# Patient Record
Sex: MALE | Race: ASIAN | NOT HISPANIC OR LATINO | URBAN - METROPOLITAN AREA
[De-identification: names, ages, dates, MRNs, and addresses within clinical notes are randomized per-mention and may not be internally consistent; named-entity substitution may affect disease eponyms.]

---

## 2021-09-03 ENCOUNTER — EMERGENCY (EMERGENCY)
Facility: HOSPITAL | Age: 51
LOS: 1 days | Discharge: HOME | End: 2021-09-03
Attending: EMERGENCY MEDICINE | Admitting: EMERGENCY MEDICINE
Payer: COMMERCIAL

## 2021-09-03 VITALS — SYSTOLIC BLOOD PRESSURE: 215 MMHG | DIASTOLIC BLOOD PRESSURE: 151 MMHG

## 2021-09-03 VITALS
SYSTOLIC BLOOD PRESSURE: 216 MMHG | RESPIRATION RATE: 20 BRPM | HEIGHT: 72 IN | DIASTOLIC BLOOD PRESSURE: 115 MMHG | TEMPERATURE: 99 F | HEART RATE: 88 BPM | WEIGHT: 214.95 LBS | OXYGEN SATURATION: 97 %

## 2021-09-03 PROCEDURE — 99284 EMERGENCY DEPT VISIT MOD MDM: CPT

## 2021-09-03 PROCEDURE — 99283 EMERGENCY DEPT VISIT LOW MDM: CPT

## 2021-09-03 RX ORDER — AMLODIPINE BESYLATE 2.5 MG/1
1 TABLET ORAL
Qty: 30 | Refills: 0
Start: 2021-09-03 | End: 2021-10-02

## 2021-09-03 RX ORDER — AMLODIPINE BESYLATE 2.5 MG/1
10 TABLET ORAL ONCE
Refills: 0 | Status: COMPLETED | OUTPATIENT
Start: 2021-09-03 | End: 2021-09-03

## 2021-09-03 RX ADMIN — Medication 0.1 MILLIGRAM(S): at 22:01

## 2021-09-03 RX ADMIN — AMLODIPINE BESYLATE 10 MILLIGRAM(S): 2.5 TABLET ORAL at 23:18

## 2021-09-03 NOTE — ED PROCEDURE NOTE - PROCEDURE ADDITIONAL DETAILS
a soup spoon was wedged between the upper teeth and unable to be removed by pt using direct pressure and traction the spoon was slowly removed on one side of the mouth allowing it to come free.  pt co soreness to jaw no other complications visible no visible dental trauma will refer to omfs

## 2021-09-03 NOTE — ED PROVIDER NOTE - NSFOLLOWUPINSTRUCTIONS_ED_ALL_ED_FT
NO CHEWING NO HARD FOODS NO SPOONS OR FORKS IN MOUTH  FOLLOW UP WITH PRIMARY CARE   FOLLOW UP WITH DENTAL OR ORAL SURGERY DR TREVIÑO [phone]  SOFT FOODS ONLY   TYLENOL FOR PAIN

## 2021-09-03 NOTE — ED PROVIDER NOTE - ENMT, MLM
Airway patent, pt has a large soup spoon wedged bewteen his upper teeth upsdie down.  no bleeeding no stridor no airway compromise the spoon is handle out easy to grasp

## 2021-09-03 NOTE — ED PROVIDER NOTE - CONSTITUTIONAL, MLM
normal... Well appearing, awake, alert, oriented to person, place, time/situation and upset aobut a soup spoon stuck in mouth no respiratory distress cooperative with care and evaluaiton able to sit

## 2021-09-03 NOTE — ED PROVIDER NOTE - PROGRESS NOTE DETAILS
spoon removed pt re-evaluated and the mouth has no abrasion contusions no visible dental trauma noted  there is a k-9 on left upper  that is sp surgery remotely and rotting but this is not acute. pt's bp remains elevated he is refusing further evaluation will offer catapres 0.1 mg and emphasize follow up with md bp not budging, will give norvasc and pt wants to leave has no sx  accepts additional meds will send rx of norvasc to pharmacy

## 2021-09-03 NOTE — ED PROVIDER NOTE - OBJECTIVE STATEMENT
pt tried to scratch his mouth with a spoon. the spoon got stuck in his mouth between the upper k9's.  after trying butter oil and other therapies including gargling with ice after 30 min he came to er for assistance  he denies airway involvement no bleeding no other complaints other than jaw pain from keeping his mouth open pt tried to taste something he was cooking and put a spoon upside down in his mouth. the spoon got stuck in his mouth between the upper k9's.  after trying butter oil and other therapies including gargling with ice after 30 min he came to er for assistance  he denies airway involvement no bleeding no other complaints other than jaw pain from keeping his mouth open  he denies any pmhx psxh has no pmd

## 2021-09-03 NOTE — ED PROVIDER NOTE - NSFOLLOWUPCLINICS_GEN_ALL_ED_FT
[facility] - ENT  Otolaryngology (ENT)  [address] [place] [address]  Phone: [phone]  Fax:     [facility] - Primary Care  Primary Care  [address] [place] [address]  Phone: [phone]  Fax:

## 2021-09-03 NOTE — ED PROVIDER NOTE - CHIEF COMPLAINT
----- Message from Bret Lambert MD sent at 3/6/2020  9:21 AM EST -----  Same 2 w  ----- Message -----  From: Tiff Caal  Sent: 3/6/2020   8:15 AM EST  To: Bret Lambert MD    INR 2.10 The patient is a 51y Male complaining of spoon stuck in mouth

## 2021-09-03 NOTE — ED PROVIDER NOTE - PATIENT PORTAL LINK FT
You can access the FollowMyHealth Patient Portal offered by [facility] by registering at the following website: [email or web address]. By joining [employer]’s FollowMyHealth portal, you will also be able to view your health information using other applications (apps) compatible with our system.

## 2021-09-03 NOTE — ED PROVIDER NOTE - CARE PLAN
1 Principal Discharge DX:	Foreign body in mouth, initial encounter   Principal Discharge DX:	Foreign body in mouth, initial encounter  Secondary Diagnosis:	Hypertension

## 2021-09-03 NOTE — ED PROVIDER NOTE - CARE PROVIDER_API CALL
Jeffrey Lizarraga (DDS)  OralMaxillofacial Surgery  [address] [facility], [address] [place] [address]  Phone: [phone]  Fax: [phone]  Follow Up Time:

## 2021-09-04 NOTE — ED ADULT NURSE NOTE - OBJECTIVE STATEMENT
Pt presents to the ED with a metal spoon stuck between his upper teeth. Pt states he was tasting something and placed the spoon upside down in his mouth and heard a "click". The spoon became lodged between his teeth. Pt attempted to remove the spoon at home but was unsuccessful, reports pain in his mouth.

## 2021-09-29 ENCOUNTER — TRANSCRIPTION ENCOUNTER (OUTPATIENT)
Age: 51
End: 2021-09-29

## 2021-11-04 ENCOUNTER — NON-APPOINTMENT (OUTPATIENT)
Age: 51
End: 2021-11-04

## 2021-11-04 ENCOUNTER — APPOINTMENT (OUTPATIENT)
Dept: INTERNAL MEDICINE | Facility: CLINIC | Age: 51
End: 2021-11-04
Payer: COMMERCIAL

## 2021-11-04 VITALS
TEMPERATURE: 97.9 F | BODY MASS INDEX: 29.12 KG/M2 | SYSTOLIC BLOOD PRESSURE: 174 MMHG | OXYGEN SATURATION: 97 % | DIASTOLIC BLOOD PRESSURE: 98 MMHG | HEIGHT: 72 IN | HEART RATE: 88 BPM | WEIGHT: 215 LBS

## 2021-11-04 VITALS — SYSTOLIC BLOOD PRESSURE: 154 MMHG | DIASTOLIC BLOOD PRESSURE: 88 MMHG

## 2021-11-04 DIAGNOSIS — Z86.19 PERSONAL HISTORY OF OTHER INFECTIOUS AND PARASITIC DISEASES: ICD-10-CM

## 2021-11-04 DIAGNOSIS — Z87.891 PERSONAL HISTORY OF NICOTINE DEPENDENCE: ICD-10-CM

## 2021-11-04 DIAGNOSIS — J30.9 ALLERGIC RHINITIS, UNSPECIFIED: ICD-10-CM

## 2021-11-04 DIAGNOSIS — Z23 ENCOUNTER FOR IMMUNIZATION: ICD-10-CM

## 2021-11-04 DIAGNOSIS — Z82.49 FAMILY HISTORY OF ISCHEMIC HEART DISEASE AND OTHER DISEASES OF THE CIRCULATORY SYSTEM: ICD-10-CM

## 2021-11-04 PROCEDURE — 99072 ADDL SUPL MATRL&STAF TM PHE: CPT

## 2021-11-04 PROCEDURE — G0008: CPT

## 2021-11-04 PROCEDURE — 99204 OFFICE O/P NEW MOD 45 MIN: CPT | Mod: 25

## 2021-11-04 PROCEDURE — 90686 IIV4 VACC NO PRSV 0.5 ML IM: CPT

## 2021-11-04 RX ORDER — B-COMPLEX WITH VITAMIN C
TABLET ORAL DAILY
Qty: 90 | Refills: 3 | Status: ACTIVE | COMMUNITY
Start: 2021-11-04

## 2021-11-04 RX ORDER — FLUTICASONE PROPIONATE 50 UG/1
50 SPRAY, METERED NASAL
Qty: 1 | Refills: 0 | Status: ACTIVE | COMMUNITY
Start: 2021-11-04

## 2021-11-04 NOTE — PHYSICAL EXAM

## 2021-11-04 NOTE — HISTORY OF PRESENT ILLNESS
[FreeTextEntry8] : Patient presented for the first time for transition of care, he's looking for a new PCP.\par \par Pt went to ED for a spoon that got stuck in his mouth in 9/2021, ED physician was able to pull it out in ED.   However, his BP was very high, and he was started on Amlodipine.  He noted that his HA actually resolved after he went on BP meds.  He had ran out of meds, and he actually had MD from his work place refill meds.  He ran out of meds 2 days ago and need refills.  His BP at home has been running 150-160/90s.\par \par Pt was well and did not get sick throughout the COVID pandemic. He had COVID vaccine and tolerated it well. \par \par He would like to have flu vaccine today.

## 2021-11-04 NOTE — ASSESSMENT
[FreeTextEntry1] : Patient has not had a physical exam for many years.  I advised that he schedule an appointment for PE, and I gave him prescription for routine labs, to be done prior to office visit.

## 2021-11-13 ENCOUNTER — LABORATORY RESULT (OUTPATIENT)
Age: 51
End: 2021-11-13

## 2021-11-15 LAB
25(OH)D3 SERPL-MCNC: 28.6 NG/ML
ALBUMIN SERPL ELPH-MCNC: 4.7 G/DL
ALP BLD-CCNC: 114 U/L
ALT SERPL-CCNC: 33 U/L
ANION GAP SERPL CALC-SCNC: 18 MMOL/L
APPEARANCE: CLEAR
AST SERPL-CCNC: 20 U/L
BASOPHILS # BLD AUTO: 0.04 K/UL
BASOPHILS NFR BLD AUTO: 0.5 %
BILIRUB SERPL-MCNC: 1 MG/DL
BILIRUBIN URINE: NEGATIVE
BLOOD URINE: NEGATIVE
BUN SERPL-MCNC: 14 MG/DL
CALCIUM SERPL-MCNC: 9.2 MG/DL
CHLORIDE SERPL-SCNC: 97 MMOL/L
CHOLEST SERPL-MCNC: 251 MG/DL
CO2 SERPL-SCNC: 21 MMOL/L
COLOR: YELLOW
CREAT SERPL-MCNC: 1.21 MG/DL
EOSINOPHIL # BLD AUTO: 0.14 K/UL
EOSINOPHIL NFR BLD AUTO: 1.6 %
ESTIMATED AVERAGE GLUCOSE: 266 MG/DL
GLUCOSE QUALITATIVE U: ABNORMAL
GLUCOSE SERPL-MCNC: 252 MG/DL
HBA1C MFR BLD HPLC: 10.9 %
HCT VFR BLD CALC: 46.2 %
HDLC SERPL-MCNC: 39 MG/DL
HGB BLD-MCNC: 16.7 G/DL
IMM GRANULOCYTES NFR BLD AUTO: 0.5 %
KETONES URINE: NORMAL
LDLC SERPL CALC-MCNC: 166 MG/DL
LEUKOCYTE ESTERASE URINE: NEGATIVE
LYMPHOCYTES # BLD AUTO: 2.45 K/UL
LYMPHOCYTES NFR BLD AUTO: 27.8 %
MAN DIFF?: NORMAL
MCHC RBC-ENTMCNC: 32.2 PG
MCHC RBC-ENTMCNC: 36.1 GM/DL
MCV RBC AUTO: 89 FL
MONOCYTES # BLD AUTO: 0.68 K/UL
MONOCYTES NFR BLD AUTO: 7.7 %
NEUTROPHILS # BLD AUTO: 5.47 K/UL
NEUTROPHILS NFR BLD AUTO: 61.9 %
NITRITE URINE: NEGATIVE
NONHDLC SERPL-MCNC: 212 MG/DL
PH URINE: 5.5
PLATELET # BLD AUTO: 228 K/UL
POTASSIUM SERPL-SCNC: 4.3 MMOL/L
PROT SERPL-MCNC: 7.5 G/DL
PROTEIN URINE: ABNORMAL
PSA SERPL-MCNC: 0.41 NG/ML
RBC # BLD: 5.19 M/UL
RBC # FLD: 11.9 %
SODIUM SERPL-SCNC: 136 MMOL/L
SPECIFIC GRAVITY URINE: 1.03
T4 FREE SERPL-MCNC: 1.3 NG/DL
TRIGL SERPL-MCNC: 227 MG/DL
TSH SERPL-ACNC: 3.44 UIU/ML
UROBILINOGEN URINE: NORMAL
WBC # FLD AUTO: 8.82 K/UL

## 2021-12-01 ENCOUNTER — APPOINTMENT (OUTPATIENT)
Dept: INTERNAL MEDICINE | Facility: CLINIC | Age: 51
End: 2021-12-01
Payer: COMMERCIAL

## 2021-12-01 ENCOUNTER — NON-APPOINTMENT (OUTPATIENT)
Age: 51
End: 2021-12-01

## 2021-12-01 VITALS
DIASTOLIC BLOOD PRESSURE: 96 MMHG | SYSTOLIC BLOOD PRESSURE: 149 MMHG | HEART RATE: 81 BPM | WEIGHT: 217 LBS | BODY MASS INDEX: 29.39 KG/M2 | TEMPERATURE: 98.4 F | OXYGEN SATURATION: 97 % | HEIGHT: 72 IN

## 2021-12-01 DIAGNOSIS — Z83.438 FAMILY HISTORY OF OTHER DISORDER OF LIPOPROTEIN METABOLISM AND OTHER LIPIDEMIA: ICD-10-CM

## 2021-12-01 PROCEDURE — 82270 OCCULT BLOOD FECES: CPT

## 2021-12-01 PROCEDURE — 99072 ADDL SUPL MATRL&STAF TM PHE: CPT

## 2021-12-01 PROCEDURE — 99396 PREV VISIT EST AGE 40-64: CPT | Mod: 25

## 2021-12-01 PROCEDURE — 93000 ELECTROCARDIOGRAM COMPLETE: CPT

## 2021-12-02 NOTE — ASSESSMENT
[FreeTextEntry1] : Patient never had a screening colonoscopy.  He was advised to consult with GI but will wait 3 months for DM control to improve.  Patient was also reminded to have routine eye exam and dental care.

## 2021-12-02 NOTE — DATA REVIEWED
[FreeTextEntry1] : EKG - NSR, R - 79, axis - (-)15, Incomplete RBBB.  Prior EKG not available for comparison.

## 2021-12-02 NOTE — PHYSICAL EXAM
[No Acute Distress] : no acute distress [Well Nourished] : well nourished [Well Developed] : well developed [Normal Sclera/Conjunctiva] : normal sclera/conjunctiva [PERRL] : pupils equal round and reactive to light [EOMI] : extraocular movements intact [Normal Outer Ear/Nose] : the outer ears and nose were normal in appearance [Normal Oropharynx] : the oropharynx was normal [Normal TMs] : both tympanic membranes were normal [Normal Nasal Mucosa] : the nasal mucosa was normal [No JVD] : no jugular venous distention [No Lymphadenopathy] : no lymphadenopathy [Supple] : supple [No Respiratory Distress] : no respiratory distress  [Clear to Auscultation] : lungs were clear to auscultation bilaterally [Normal Rate] : normal rate  [Regular Rhythm] : with a regular rhythm [Normal S1, S2] : normal S1 and S2 [No Carotid Bruits] : no carotid bruits [Pedal Pulses Present] : the pedal pulses are present [No Edema] : there was no peripheral edema [No Extremity Clubbing/Cyanosis] : no extremity clubbing/cyanosis [Normal Appearance] : normal in appearance [No Nipple Discharge] : no nipple discharge [No Axillary Lymphadenopathy] : no axillary lymphadenopathy [Soft] : abdomen soft [Non Tender] : non-tender [Non-distended] : non-distended [No Masses] : no abdominal mass palpated [No HSM] : no HSM [Normal Bowel Sounds] : normal bowel sounds [Normal Sphincter Tone] : normal sphincter tone [No Mass] : no mass [Prostate Enlargement] : the prostate was not enlarged [Prostate Tenderness] : the prostate was not tender [No Prostate Nodules] : no prostate nodules [Normal Supraclavicular Nodes] : no supraclavicular lymphadenopathy [Normal Axillary Nodes] : no axillary lymphadenopathy [Normal Posterior Cervical Nodes] : no posterior cervical lymphadenopathy [Normal Anterior Cervical Nodes] : no anterior cervical lymphadenopathy [No CVA Tenderness] : no CVA  tenderness [No Spinal Tenderness] : no spinal tenderness [No Joint Swelling] : no joint swelling [Grossly Normal Strength/Tone] : grossly normal strength/tone [No Rash] : no rash [Coordination Grossly Intact] : coordination grossly intact [No Focal Deficits] : no focal deficits [Normal Gait] : normal gait [Speech Grossly Normal] : speech grossly normal [Normal Affect] : the affect was normal [Alert and Oriented x3] : oriented to person, place, and time [Normal Mood] : the mood was normal [Stool Occult Blood] : stool negative for occult blood [de-identified] : Overweight male in stated age,

## 2021-12-02 NOTE — HEALTH RISK ASSESSMENT
[Yes] : Yes [Monthly or less (1 pt)] : Monthly or less (1 point) [1 or 2 (0 pts)] : 1 or 2 (0 points) [Never (0 pts)] : Never (0 points) [No] : In the past 12 months have you used drugs other than those required for medical reasons? No [Fair] : ~his/her~ current health as fair  [Good] : ~his/her~  mood as  good [No falls in past year] : Patient reported no falls in the past year [0] : 2) Feeling down, depressed, or hopeless: Not at all (0) [PHQ-2 Negative - No further assessment needed] : PHQ-2 Negative - No further assessment needed [None] : None [With Family] : lives with family [# of Members in Household ___] :  household currently consist of [unfilled] member(s) [Employed] : employed [Graduate School] : graduate school [] :  [# Of Children ___] : has [unfilled] children [Feels Safe at Home] : Feels safe at home [Fully functional (bathing, dressing, toileting, transferring, walking, feeding)] : Fully functional (bathing, dressing, toileting, transferring, walking, feeding) [Fully functional (using the telephone, shopping, preparing meals, housekeeping, doing laundry, using] : Fully functional and needs no help or supervision to perform IADLs (using the telephone, shopping, preparing meals, housekeeping, doing laundry, using transportation, managing medications and managing finances) [Smoke Detector] : smoke detector [Seat Belt] :  uses seat belt [] : No [Audit-CScore] : 1 [de-identified] : 30 minutes 1-2 x per week, [[id number]] : 0 [EyeExamDate] : 08/21, vision exam only [Change in mental status noted] : No change in mental status noted [Reports changes in hearing] : Reports no changes in hearing [Reports changes in vision] : Reports no changes in vision [Reports changes in dental health] : Reports no changes in dental health [ColonoscopyDate] : Never [de-identified] : dentist - 2019

## 2021-12-02 NOTE — HISTORY OF PRESENT ILLNESS
[FreeTextEntry1] : Pt presented for PE.  Last PE was many years ago. [de-identified] : Pt saw me for the first time last month after a visit to ED because of spoon being stuck in his mouth.  He was found to have elevated BP.  He saw  me for treatment of HTN.  He was started on Amlodipine 10 mg daily.  He tolerated meds well, and BP has improved, but is still not  optimal.  BP mostly 140-160/90s.  Pt is trying to make some lifestyle changes, but not doing much exercise yet.\par \par Pt c/o shoulder pain when he does push up.

## 2021-12-02 NOTE — REVIEW OF SYSTEMS
[Negative] : Heme/Lymph [Joint Pain] : joint pain [Chest Pain] : no chest pain [Palpitations] : no palpitations [Claudication] : no  leg claudication [Lower Ext Edema] : no lower extremity edema [Shortness Of Breath] : no shortness of breath [Wheezing] : no wheezing [Cough] : no cough [Dyspnea on Exertion] : not dyspnea on exertion [Abdominal Pain] : no abdominal pain [Nausea] : no nausea [Constipation] : no constipation [Diarrhea] : no diarrhea [Vomiting] : no vomiting [Heartburn] : no heartburn [Melena] : no melena [Dysuria] : no dysuria [Incontinence] : no incontinence [Nocturia] : no nocturia [Joint Stiffness] : no joint stiffness [Muscle Pain] : no muscle pain [Back Pain] : no back pain [Joint Swelling] : no joint swelling [Itching] : no itching [Mole Changes] : no mole changes [Skin Rash] : no skin rash [Headache] : no headache [Dizziness] : no dizziness [Fainting] : no fainting [Unsteady Walk] : no ataxia [Insomnia] : no insomnia [Anxiety] : no anxiety [Depression] : no depression [Easy Bleeding] : no easy bleeding [Easy Bruising] : no easy bruising [Swollen Glands] : no swollen glands [FreeTextEntry9] : B/L shoulder pain as in HPI,  [de-identified] : HA resolved on BP meds,

## 2022-02-08 ENCOUNTER — TRANSCRIPTION ENCOUNTER (OUTPATIENT)
Age: 52
End: 2022-02-08

## 2022-02-16 ENCOUNTER — TRANSCRIPTION ENCOUNTER (OUTPATIENT)
Age: 52
End: 2022-02-16

## 2022-02-28 ENCOUNTER — TRANSCRIPTION ENCOUNTER (OUTPATIENT)
Age: 52
End: 2022-02-28

## 2022-03-01 ENCOUNTER — APPOINTMENT (OUTPATIENT)
Dept: INTERNAL MEDICINE | Facility: CLINIC | Age: 52
End: 2022-03-01
Payer: COMMERCIAL

## 2022-03-01 VITALS
DIASTOLIC BLOOD PRESSURE: 110 MMHG | SYSTOLIC BLOOD PRESSURE: 175 MMHG | TEMPERATURE: 97.9 F | WEIGHT: 217 LBS | HEART RATE: 84 BPM | HEIGHT: 72 IN | BODY MASS INDEX: 29.39 KG/M2 | OXYGEN SATURATION: 96 %

## 2022-03-01 VITALS — DIASTOLIC BLOOD PRESSURE: 104 MMHG | SYSTOLIC BLOOD PRESSURE: 162 MMHG

## 2022-03-01 PROCEDURE — 99214 OFFICE O/P EST MOD 30 MIN: CPT

## 2022-03-01 PROCEDURE — 99072 ADDL SUPL MATRL&STAF TM PHE: CPT

## 2022-03-01 RX ORDER — ADHESIVE TAPE 3"X 2.3 YD
50 MCG TAPE, NON-MEDICATED TOPICAL
Qty: 90 | Refills: 3 | Status: ACTIVE | COMMUNITY
Start: 2021-11-04

## 2022-03-01 NOTE — HISTORY OF PRESENT ILLNESS
[FreeTextEntry1] : Pt presented for 3 month f/u of HTN and DM. [de-identified] : Pt saw eye doctor and noted no diabetic retinopathy.  Vision actually improved with diabetic meds and better glycemic control.\par \par He is on Losartan 100 mg daily, and BP at home is 160-170/100s.\par \par He feels well otherwise, he has some joint pain that was brief.\par \par Pt was well and did not get sick throughout the COVID pandemic.   He got his 3rd dose in 12/2021.\par \par He is eating healthier, but not much exercise, so no weight loss yet.

## 2022-03-01 NOTE — PHYSICAL EXAM
[No Acute Distress] : no acute distress [Well Nourished] : well nourished [Well Developed] : well developed [Supple] : supple [No Respiratory Distress] : no respiratory distress  [Clear to Auscultation] : lungs were clear to auscultation bilaterally [Normal Rate] : normal rate  [Regular Rhythm] : with a regular rhythm [Normal S1, S2] : normal S1 and S2 [No Edema] : there was no peripheral edema [Soft] : abdomen soft [Non Tender] : non-tender [Normal Bowel Sounds] : normal bowel sounds [No CVA Tenderness] : no CVA  tenderness [No Spinal Tenderness] : no spinal tenderness [No Joint Swelling] : no joint swelling [Grossly Normal Strength/Tone] : grossly normal strength/tone [Normal Gait] : normal gait [Speech Grossly Normal] : speech grossly normal [Normal Affect] : the affect was normal [Alert and Oriented x3] : oriented to person, place, and time [Normal Mood] : the mood was normal [de-identified] : Overweight male in stated age,

## 2022-03-07 LAB
ALBUMIN SERPL ELPH-MCNC: 4.8 G/DL
ALP BLD-CCNC: 92 U/L
ALT SERPL-CCNC: 36 U/L
ANION GAP SERPL CALC-SCNC: 17 MMOL/L
AST SERPL-CCNC: 25 U/L
BILIRUB SERPL-MCNC: 0.9 MG/DL
BUN SERPL-MCNC: 18 MG/DL
CALCIUM SERPL-MCNC: 10.1 MG/DL
CHLORIDE SERPL-SCNC: 103 MMOL/L
CHOLEST SERPL-MCNC: 208 MG/DL
CO2 SERPL-SCNC: 20 MMOL/L
CREAT SERPL-MCNC: 1.13 MG/DL
EGFR: 78 ML/MIN/1.73M2
ESTIMATED AVERAGE GLUCOSE: 174 MG/DL
GLUCOSE SERPL-MCNC: 140 MG/DL
HBA1C MFR BLD HPLC: 7.7 %
HDLC SERPL-MCNC: 42 MG/DL
LDLC SERPL CALC-MCNC: 116 MG/DL
NONHDLC SERPL-MCNC: 166 MG/DL
POTASSIUM SERPL-SCNC: 4.5 MMOL/L
PROT SERPL-MCNC: 7.6 G/DL
SODIUM SERPL-SCNC: 139 MMOL/L
TRIGL SERPL-MCNC: 253 MG/DL

## 2022-04-15 ENCOUNTER — TRANSCRIPTION ENCOUNTER (OUTPATIENT)
Age: 52
End: 2022-04-15

## 2022-04-18 ENCOUNTER — TRANSCRIPTION ENCOUNTER (OUTPATIENT)
Age: 52
End: 2022-04-18

## 2022-06-01 ENCOUNTER — APPOINTMENT (OUTPATIENT)
Dept: INTERNAL MEDICINE | Facility: CLINIC | Age: 52
End: 2022-06-01

## 2022-06-06 ENCOUNTER — TRANSCRIPTION ENCOUNTER (OUTPATIENT)
Age: 52
End: 2022-06-06

## 2022-06-07 ENCOUNTER — TRANSCRIPTION ENCOUNTER (OUTPATIENT)
Age: 52
End: 2022-06-07

## 2022-06-09 ENCOUNTER — TRANSCRIPTION ENCOUNTER (OUTPATIENT)
Age: 52
End: 2022-06-09

## 2022-06-10 ENCOUNTER — TRANSCRIPTION ENCOUNTER (OUTPATIENT)
Age: 52
End: 2022-06-10

## 2022-07-05 ENCOUNTER — RX CHANGE (OUTPATIENT)
Age: 52
End: 2022-07-05

## 2022-07-05 RX ORDER — METFORMIN HYDROCHLORIDE 500 MG/1
500 TABLET, COATED ORAL DAILY
Qty: 90 | Refills: 0 | Status: DISCONTINUED | COMMUNITY
Start: 2021-12-01 | End: 2022-07-05

## 2022-07-15 ENCOUNTER — TRANSCRIPTION ENCOUNTER (OUTPATIENT)
Age: 52
End: 2022-07-15

## 2022-07-18 ENCOUNTER — TRANSCRIPTION ENCOUNTER (OUTPATIENT)
Age: 52
End: 2022-07-18

## 2022-07-19 ENCOUNTER — TRANSCRIPTION ENCOUNTER (OUTPATIENT)
Age: 52
End: 2022-07-19

## 2022-08-01 LAB
ALBUMIN SERPL ELPH-MCNC: 4.6 G/DL
ALP BLD-CCNC: 93 U/L
ALT SERPL-CCNC: 22 U/L
ANION GAP SERPL CALC-SCNC: 18 MMOL/L
AST SERPL-CCNC: 22 U/L
BILIRUB SERPL-MCNC: 0.7 MG/DL
BUN SERPL-MCNC: 15 MG/DL
CALCIUM SERPL-MCNC: 9.9 MG/DL
CHLORIDE SERPL-SCNC: 102 MMOL/L
CHOLEST SERPL-MCNC: 202 MG/DL
CO2 SERPL-SCNC: 21 MMOL/L
CREAT SERPL-MCNC: 1.52 MG/DL
EGFR: 54 ML/MIN/1.73M2
ESTIMATED AVERAGE GLUCOSE: 148 MG/DL
GLUCOSE SERPL-MCNC: 136 MG/DL
HBA1C MFR BLD HPLC: 6.8 %
HDLC SERPL-MCNC: 41 MG/DL
LDLC SERPL CALC-MCNC: 116 MG/DL
NONHDLC SERPL-MCNC: 162 MG/DL
POTASSIUM SERPL-SCNC: 4.8 MMOL/L
PROT SERPL-MCNC: 7.4 G/DL
SODIUM SERPL-SCNC: 141 MMOL/L
TRIGL SERPL-MCNC: 230 MG/DL

## 2022-08-05 ENCOUNTER — APPOINTMENT (OUTPATIENT)
Dept: INTERNAL MEDICINE | Facility: CLINIC | Age: 52
End: 2022-08-05

## 2022-08-05 VITALS
WEIGHT: 213 LBS | SYSTOLIC BLOOD PRESSURE: 114 MMHG | OXYGEN SATURATION: 98 % | TEMPERATURE: 98.2 F | BODY MASS INDEX: 28.85 KG/M2 | HEART RATE: 78 BPM | DIASTOLIC BLOOD PRESSURE: 74 MMHG | HEIGHT: 72 IN | RESPIRATION RATE: 15 BRPM

## 2022-08-05 VITALS — SYSTOLIC BLOOD PRESSURE: 116 MMHG | DIASTOLIC BLOOD PRESSURE: 74 MMHG

## 2022-08-05 PROCEDURE — 99214 OFFICE O/P EST MOD 30 MIN: CPT

## 2022-08-05 RX ORDER — COVID-19 ANTIGEN TEST
KIT MISCELLANEOUS
Qty: 8 | Refills: 0 | Status: ACTIVE | COMMUNITY
Start: 2022-06-27

## 2022-08-05 RX ORDER — ZOSTER VACCINE RECOMBINANT, ADJUVANTED 50 MCG/0.5
50 KIT INTRAMUSCULAR
Qty: 1 | Refills: 1 | Status: COMPLETED | COMMUNITY
Start: 2022-02-15 | End: 2022-08-05

## 2022-08-05 RX ORDER — ZOSTER VACCINE RECOMBINANT, ADJUVANTED 50 MCG/0.5
50 KIT INTRAMUSCULAR
Qty: 1 | Refills: 1 | Status: COMPLETED | COMMUNITY
Start: 2022-02-28 | End: 2022-08-05

## 2022-08-06 NOTE — PHYSICAL EXAM
[No Acute Distress] : no acute distress [Well Nourished] : well nourished [Well Developed] : well developed [Supple] : supple [No Respiratory Distress] : no respiratory distress  [Clear to Auscultation] : lungs were clear to auscultation bilaterally [Normal Rate] : normal rate  [Regular Rhythm] : with a regular rhythm [Normal S1, S2] : normal S1 and S2 [No Edema] : there was no peripheral edema [Soft] : abdomen soft [Non Tender] : non-tender [Normal Bowel Sounds] : normal bowel sounds [No CVA Tenderness] : no CVA  tenderness [No Spinal Tenderness] : no spinal tenderness [No Joint Swelling] : no joint swelling [Grossly Normal Strength/Tone] : grossly normal strength/tone [Normal Gait] : normal gait [Speech Grossly Normal] : speech grossly normal [Normal Affect] : the affect was normal [Alert and Oriented x3] : oriented to person, place, and time [Normal Mood] : the mood was normal [de-identified] : Overweight male in stated age,

## 2022-08-06 NOTE — HISTORY OF PRESENT ILLNESS
[FreeTextEntry1] : Pt presented for f/u on HTN, HLD and glucose intolerance.  He had labs done and would like to review results. [de-identified] : Pt had a new job and is going through training.\par \par His daughter got COVID infection from school, and the whole family got it too.  Pt got the infection in early 7/2022.  He's had 3 doses of COVID vaccine.\par \par Patient feels well and has no other complaints.

## 2022-08-08 ENCOUNTER — TRANSCRIPTION ENCOUNTER (OUTPATIENT)
Age: 52
End: 2022-08-08

## 2022-08-26 ENCOUNTER — TRANSCRIPTION ENCOUNTER (OUTPATIENT)
Age: 52
End: 2022-08-26

## 2022-11-11 ENCOUNTER — RX RENEWAL (OUTPATIENT)
Age: 52
End: 2022-11-11

## 2022-11-23 ENCOUNTER — RX RENEWAL (OUTPATIENT)
Age: 52
End: 2022-11-23

## 2022-12-07 ENCOUNTER — TRANSCRIPTION ENCOUNTER (OUTPATIENT)
Age: 52
End: 2022-12-07

## 2022-12-08 ENCOUNTER — TRANSCRIPTION ENCOUNTER (OUTPATIENT)
Age: 52
End: 2022-12-08

## 2022-12-12 LAB
25(OH)D3 SERPL-MCNC: 42.3 NG/ML
ALBUMIN SERPL ELPH-MCNC: 5.1 G/DL
ALP BLD-CCNC: 97 U/L
ALT SERPL-CCNC: 38 U/L
ANION GAP SERPL CALC-SCNC: 15 MMOL/L
APPEARANCE: CLEAR
AST SERPL-CCNC: 39 U/L
BASOPHILS # BLD AUTO: 0.05 K/UL
BASOPHILS NFR BLD AUTO: 0.6 %
BILIRUB SERPL-MCNC: 0.6 MG/DL
BILIRUBIN URINE: NEGATIVE
BLOOD URINE: NEGATIVE
BUN SERPL-MCNC: 22 MG/DL
CALCIUM SERPL-MCNC: 10.4 MG/DL
CHLORIDE SERPL-SCNC: 99 MMOL/L
CHOLEST SERPL-MCNC: 202 MG/DL
CK SERPL-CCNC: 864 U/L
CO2 SERPL-SCNC: 25 MMOL/L
COLOR: YELLOW
CREAT SERPL-MCNC: 1.53 MG/DL
EGFR: 54 ML/MIN/1.73M2
EOSINOPHIL # BLD AUTO: 0.2 K/UL
EOSINOPHIL NFR BLD AUTO: 2.4 %
ESTIMATED AVERAGE GLUCOSE: 137 MG/DL
GLUCOSE QUALITATIVE U: NEGATIVE
GLUCOSE SERPL-MCNC: 130 MG/DL
HBA1C MFR BLD HPLC: 6.4 %
HCT VFR BLD CALC: 46.6 %
HDLC SERPL-MCNC: 46 MG/DL
HGB BLD-MCNC: 16.5 G/DL
IMM GRANULOCYTES NFR BLD AUTO: 0.4 %
KETONES URINE: NEGATIVE
LDLC SERPL CALC-MCNC: 109 MG/DL
LEUKOCYTE ESTERASE URINE: NEGATIVE
LYMPHOCYTES # BLD AUTO: 2.05 K/UL
LYMPHOCYTES NFR BLD AUTO: 25 %
MAN DIFF?: NORMAL
MCHC RBC-ENTMCNC: 32.2 PG
MCHC RBC-ENTMCNC: 35.4 GM/DL
MCV RBC AUTO: 91 FL
MONOCYTES # BLD AUTO: 0.63 K/UL
MONOCYTES NFR BLD AUTO: 7.7 %
NEUTROPHILS # BLD AUTO: 5.25 K/UL
NEUTROPHILS NFR BLD AUTO: 63.9 %
NITRITE URINE: NEGATIVE
NONHDLC SERPL-MCNC: 156 MG/DL
PH URINE: 5.5
PLATELET # BLD AUTO: 221 K/UL
POTASSIUM SERPL-SCNC: 5.6 MMOL/L
PROT SERPL-MCNC: 7.8 G/DL
PROTEIN URINE: NORMAL
PSA SERPL-MCNC: 0.36 NG/ML
RBC # BLD: 5.12 M/UL
RBC # FLD: 11.9 %
SODIUM SERPL-SCNC: 139 MMOL/L
SPECIFIC GRAVITY URINE: 1.02
TRIGL SERPL-MCNC: 235 MG/DL
UROBILINOGEN URINE: NORMAL
WBC # FLD AUTO: 8.21 K/UL

## 2022-12-16 ENCOUNTER — NON-APPOINTMENT (OUTPATIENT)
Age: 52
End: 2022-12-16

## 2022-12-16 ENCOUNTER — APPOINTMENT (OUTPATIENT)
Dept: INTERNAL MEDICINE | Facility: CLINIC | Age: 52
End: 2022-12-16

## 2022-12-16 VITALS
WEIGHT: 219 LBS | SYSTOLIC BLOOD PRESSURE: 122 MMHG | OXYGEN SATURATION: 98 % | HEIGHT: 72 IN | DIASTOLIC BLOOD PRESSURE: 77 MMHG | HEART RATE: 83 BPM | TEMPERATURE: 97.8 F | BODY MASS INDEX: 29.66 KG/M2

## 2022-12-16 VITALS — DIASTOLIC BLOOD PRESSURE: 78 MMHG | SYSTOLIC BLOOD PRESSURE: 126 MMHG

## 2022-12-16 DIAGNOSIS — E55.9 VITAMIN D DEFICIENCY, UNSPECIFIED: ICD-10-CM

## 2022-12-16 DIAGNOSIS — U07.1 COVID-19: ICD-10-CM

## 2022-12-16 DIAGNOSIS — L98.9 DISORDER OF THE SKIN AND SUBCUTANEOUS TISSUE, UNSPECIFIED: ICD-10-CM

## 2022-12-16 PROCEDURE — 99396 PREV VISIT EST AGE 40-64: CPT | Mod: 25

## 2022-12-16 PROCEDURE — 82270 OCCULT BLOOD FECES: CPT

## 2022-12-16 PROCEDURE — 93000 ELECTROCARDIOGRAM COMPLETE: CPT

## 2022-12-16 NOTE — PHYSICAL EXAM
[No Acute Distress] : no acute distress [Well Nourished] : well nourished [Well Developed] : well developed [Normal Sclera/Conjunctiva] : normal sclera/conjunctiva [PERRL] : pupils equal round and reactive to light [EOMI] : extraocular movements intact [Normal Outer Ear/Nose] : the outer ears and nose were normal in appearance [Normal Oropharynx] : the oropharynx was normal [Normal TMs] : both tympanic membranes were normal [Normal Nasal Mucosa] : the nasal mucosa was normal [No JVD] : no jugular venous distention [No Lymphadenopathy] : no lymphadenopathy [Supple] : supple [No Respiratory Distress] : no respiratory distress  [Clear to Auscultation] : lungs were clear to auscultation bilaterally [Normal Rate] : normal rate  [Regular Rhythm] : with a regular rhythm [Normal S1, S2] : normal S1 and S2 [No Carotid Bruits] : no carotid bruits [Pedal Pulses Present] : the pedal pulses are present [No Edema] : there was no peripheral edema [No Extremity Clubbing/Cyanosis] : no extremity clubbing/cyanosis [Normal Appearance] : normal in appearance [No Nipple Discharge] : no nipple discharge [No Axillary Lymphadenopathy] : no axillary lymphadenopathy [Soft] : abdomen soft [Non Tender] : non-tender [Non-distended] : non-distended [No Masses] : no abdominal mass palpated [No HSM] : no HSM [Normal Bowel Sounds] : normal bowel sounds [Normal Sphincter Tone] : normal sphincter tone [No Mass] : no mass [Prostate Enlargement] : the prostate was not enlarged [Prostate Tenderness] : the prostate was not tender [No Prostate Nodules] : no prostate nodules [Normal Supraclavicular Nodes] : no supraclavicular lymphadenopathy [Normal Axillary Nodes] : no axillary lymphadenopathy [Normal Posterior Cervical Nodes] : no posterior cervical lymphadenopathy [Normal Anterior Cervical Nodes] : no anterior cervical lymphadenopathy [No CVA Tenderness] : no CVA  tenderness [No Spinal Tenderness] : no spinal tenderness [No Joint Swelling] : no joint swelling [Grossly Normal Strength/Tone] : grossly normal strength/tone [No Rash] : no rash [Coordination Grossly Intact] : coordination grossly intact [No Focal Deficits] : no focal deficits [Normal Gait] : normal gait [Speech Grossly Normal] : speech grossly normal [Normal Affect] : the affect was normal [Alert and Oriented x3] : oriented to person, place, and time [Normal Mood] : the mood was normal [Stool Occult Blood] : stool negative for occult blood [de-identified] : Overweight male in stated age,  [de-identified] : Pt has 2 brown spot, slightly raised lesions on L axillae, the borderline were well defined, and the surface was relatively smooth,

## 2022-12-16 NOTE — ASSESSMENT
[FreeTextEntry1] : Patient never had a screening colonoscopy, and advised to consult with GI for the test.  Patient was also reminded to have routine eye exam and dental care.

## 2022-12-16 NOTE — HEALTH RISK ASSESSMENT
[Yes] : Yes [Monthly or less (1 pt)] : Monthly or less (1 point) [1 or 2 (0 pts)] : 1 or 2 (0 points) [Never (0 pts)] : Never (0 points) [No] : In the past 12 months have you used drugs other than those required for medical reasons? No [No falls in past year] : Patient reported no falls in the past year [0] : 2) Feeling down, depressed, or hopeless: Not at all (0) [PHQ-2 Negative - No further assessment needed] : PHQ-2 Negative - No further assessment needed [None] : None [With Family] : lives with family [# of Members in Household ___] :  household currently consist of [unfilled] member(s) [Employed] : employed [Graduate School] : graduate school [] :  [# Of Children ___] : has [unfilled] children [Feels Safe at Home] : Feels safe at home [Fully functional (bathing, dressing, toileting, transferring, walking, feeding)] : Fully functional (bathing, dressing, toileting, transferring, walking, feeding) [Fully functional (using the telephone, shopping, preparing meals, housekeeping, doing laundry, using] : Fully functional and needs no help or supervision to perform IADLs (using the telephone, shopping, preparing meals, housekeeping, doing laundry, using transportation, managing medications and managing finances) [Smoke Detector] : smoke detector [Seat Belt] :  uses seat belt [Good] : ~his/her~ current health as good [Former] : Former [0-4] : 0-4 [YearQuit] : 1990 [Audit-CScore] : 1 [de-identified] : 90 minutes 4 x per week, [[id number]] : 0 [EyeExamDate] : 03/22 [Change in mental status noted] : No change in mental status noted [Reports changes in hearing] : Reports no changes in hearing [Reports changes in dental health] : Reports no changes in dental health [Reports changes in vision] : Reports no changes in vision [ColonoscopyDate] : Never [de-identified] : dentist - 2019

## 2022-12-16 NOTE — HISTORY OF PRESENT ILLNESS
[FreeTextEntry1] : Pt presented for PE.  Last PE was 1 year ago. [de-identified] : Pt had COVID infection in early 7/2022, it was mild and recovered completely.  He had 4 doses of COVID vaccine including the bivalent booster vaccine and he also had Flu vaccine.\par \par His health was o/w uneventful.\par \par He has some discolored skin lesion in the L axillae for a few months, no change and no pain, pt noticed it because he was scratching it .

## 2022-12-16 NOTE — REVIEW OF SYSTEMS
[Joint Pain] : joint pain [Negative] : Heme/Lymph [Fever] : no fever [Chills] : no chills [Fatigue] : no fatigue [Recent Change In Weight] : ~T no recent weight change [Chest Pain] : no chest pain [Palpitations] : no palpitations [Claudication] : no  leg claudication [Lower Ext Edema] : no lower extremity edema [Shortness Of Breath] : no shortness of breath [Wheezing] : no wheezing [Cough] : no cough [Dyspnea on Exertion] : not dyspnea on exertion [Abdominal Pain] : no abdominal pain [Nausea] : no nausea [Constipation] : no constipation [Diarrhea] : no diarrhea [Vomiting] : no vomiting [Heartburn] : no heartburn [Melena] : no melena [Dysuria] : no dysuria [Incontinence] : no incontinence [Nocturia] : no nocturia [Joint Stiffness] : no joint stiffness [Muscle Pain] : no muscle pain [Back Pain] : no back pain [Joint Swelling] : no joint swelling [Itching] : no itching [Mole Changes] : no mole changes [Skin Rash] : no skin rash [Headache] : no headache [Dizziness] : no dizziness [Fainting] : no fainting [Unsteady Walk] : no ataxia [Insomnia] : no insomnia [Anxiety] : no anxiety [Depression] : no depression [Easy Bleeding] : no easy bleeding [Easy Bruising] : no easy bruising [Swollen Glands] : no swollen glands [de-identified] : Skin lesion on L axillae as in HPI,

## 2022-12-20 ENCOUNTER — NON-APPOINTMENT (OUTPATIENT)
Age: 52
End: 2022-12-20

## 2022-12-20 LAB
ANION GAP SERPL CALC-SCNC: 15 MMOL/L
BUN SERPL-MCNC: 18 MG/DL
CALCIUM SERPL-MCNC: 10 MG/DL
CHLORIDE SERPL-SCNC: 95 MMOL/L
CK SERPL-CCNC: 129 U/L
CO2 SERPL-SCNC: 26 MMOL/L
CREAT SERPL-MCNC: 1.34 MG/DL
EGFR: 63 ML/MIN/1.73M2
GLUCOSE SERPL-MCNC: 116 MG/DL
POTASSIUM SERPL-SCNC: 4.4 MMOL/L
SODIUM SERPL-SCNC: 136 MMOL/L

## 2022-12-21 ENCOUNTER — APPOINTMENT (OUTPATIENT)
Dept: GASTROENTEROLOGY | Facility: CLINIC | Age: 52
End: 2022-12-21

## 2022-12-21 VITALS
WEIGHT: 220 LBS | HEART RATE: 88 BPM | HEIGHT: 72 IN | BODY MASS INDEX: 29.8 KG/M2 | DIASTOLIC BLOOD PRESSURE: 74 MMHG | SYSTOLIC BLOOD PRESSURE: 138 MMHG

## 2022-12-21 PROCEDURE — 99203 OFFICE O/P NEW LOW 30 MIN: CPT

## 2022-12-21 NOTE — ED ADULT TRIAGE NOTE - WEIGHT METHOD
stated Clindamycin Counseling: I counseled the patient regarding use of clindamycin as an antibiotic for prophylactic and/or therapeutic purposes. Clindamycin is active against numerous classes of bacteria, including skin bacteria. Side effects may include nausea, diarrhea, gastrointestinal upset, rash, hives, yeast infections, and in rare cases, colitis.

## 2022-12-21 NOTE — HISTORY OF PRESENT ILLNESS
[FreeTextEntry1] : Rich presents to the office today with his wife for evaluation for colon cancer screening.  \par \par The patient feels well from a GI perspective, and denies any dysphagia, nausea, abdominal pain, change in bowel habits, GI bleeding, weight loss, or family history of colon cancer.  He normally moves his bowels once a day.  He has never had a colonoscopy before.

## 2022-12-21 NOTE — ASSESSMENT
[FreeTextEntry1] : 1.  Encounter for colon cancer screening in average risk patient.  No previous colonoscopy.\par 2.  Diabetes mellitus\par 3.  HTN.\par 4.  HLD.\par \par Recs:\par - Recent labs reviewed.\par - Patient was advised to undergo colonoscopy- procedure, risks, benefits, and alternatives were explained. Patient agreeable. Brochure given. PEG prep.

## 2023-02-13 DIAGNOSIS — Z01.818 ENCOUNTER FOR OTHER PREPROCEDURAL EXAMINATION: ICD-10-CM

## 2023-02-14 ENCOUNTER — RX RENEWAL (OUTPATIENT)
Age: 53
End: 2023-02-14

## 2023-02-14 LAB — SARS-COV-2 N GENE NPH QL NAA+PROBE: NOT DETECTED

## 2023-02-17 ENCOUNTER — APPOINTMENT (OUTPATIENT)
Dept: GASTROENTEROLOGY | Facility: CLINIC | Age: 53
End: 2023-02-17
Payer: COMMERCIAL

## 2023-02-17 DIAGNOSIS — Z12.11 ENCOUNTER FOR SCREENING FOR MALIGNANT NEOPLASM OF COLON: ICD-10-CM

## 2023-02-17 PROCEDURE — 45378 DIAGNOSTIC COLONOSCOPY: CPT | Mod: 59

## 2023-02-17 PROCEDURE — 99212 OFFICE O/P EST SF 10 MIN: CPT | Mod: 25

## 2023-02-17 NOTE — PHYSICAL EXAM
[Alert] : alert [Sclera] : the sclera and conjunctiva were normal [Hearing Threshold Finger Rub Not [place]] : hearing was normal [No Respiratory Distress] : no respiratory distress [Auscultation Breath Sounds / Voice Sounds] : lungs were clear to auscultation bilaterally [Heart Rate And Rhythm] : heart rate was normal and rhythm regular [Normal S1, S2] : normal S1 and S2 [Bowel Sounds] : normal bowel sounds [Abdomen Soft] : soft [No Focal Deficits] : no focal deficits [Oriented To Time, Place, And Person] : oriented to person, place, and time

## 2023-02-17 NOTE — HISTORY OF PRESENT ILLNESS
[FreeTextEntry1] : Rich presents to the office today to undergo a screening colonoscopy.\par \par Since his last visit in December 2022, the patient has felt well from a GI perspective.  He completed the PEG prep without any issues.  He does not have a family history of colon cancer and has never had a colonoscopy before.

## 2023-05-10 ENCOUNTER — RX RENEWAL (OUTPATIENT)
Age: 53
End: 2023-05-10

## 2023-06-16 ENCOUNTER — APPOINTMENT (OUTPATIENT)
Dept: INTERNAL MEDICINE | Facility: CLINIC | Age: 53
End: 2023-06-16

## 2023-08-08 ENCOUNTER — RX RENEWAL (OUTPATIENT)
Age: 53
End: 2023-08-08

## 2023-11-06 ENCOUNTER — RX RENEWAL (OUTPATIENT)
Age: 53
End: 2023-11-06

## 2023-11-11 ENCOUNTER — EMERGENCY (EMERGENCY)
Facility: HOSPITAL | Age: 53
LOS: 1 days | Discharge: ANOTHER HOSPITAL | End: 2023-11-11
Attending: EMERGENCY MEDICINE | Admitting: EMERGENCY MEDICINE
Payer: COMMERCIAL

## 2023-11-11 ENCOUNTER — INPATIENT (INPATIENT)
Facility: HOSPITAL | Age: 53
LOS: 0 days | Discharge: HOME | DRG: 322 | End: 2023-11-12
Attending: INTERNAL MEDICINE | Admitting: STUDENT IN AN ORGANIZED HEALTH CARE EDUCATION/TRAINING PROGRAM
Payer: COMMERCIAL

## 2023-11-11 VITALS
HEART RATE: 90 BPM | SYSTOLIC BLOOD PRESSURE: 131 MMHG | DIASTOLIC BLOOD PRESSURE: 75 MMHG | RESPIRATION RATE: 21 BRPM | TEMPERATURE: 98 F | WEIGHT: 224.87 LBS | OXYGEN SATURATION: 97 % | HEIGHT: 72 IN

## 2023-11-11 VITALS — DIASTOLIC BLOOD PRESSURE: 58 MMHG | SYSTOLIC BLOOD PRESSURE: 120 MMHG | HEART RATE: 84 BPM

## 2023-11-11 VITALS
HEIGHT: 72 IN | OXYGEN SATURATION: 100 % | SYSTOLIC BLOOD PRESSURE: 128 MMHG | TEMPERATURE: 97 F | RESPIRATION RATE: 48 BRPM | HEART RATE: 78 BPM | DIASTOLIC BLOOD PRESSURE: 88 MMHG | WEIGHT: 220.02 LBS

## 2023-11-11 DIAGNOSIS — I21.3 ST ELEVATION (STEMI) MYOCARDIAL INFARCTION OF UNSPECIFIED SITE: ICD-10-CM

## 2023-11-11 LAB
A1C WITH ESTIMATED AVERAGE GLUCOSE RESULT: 7.3 % — HIGH (ref 4–5.6)
A1C WITH ESTIMATED AVERAGE GLUCOSE RESULT: 7.3 % — HIGH (ref 4–5.6)
ALBUMIN SERPL ELPH-MCNC: 4 G/DL — SIGNIFICANT CHANGE UP (ref 3.3–5)
ALP SERPL-CCNC: 86 U/L — SIGNIFICANT CHANGE UP (ref 40–120)
ALP SERPL-CCNC: 86 U/L — SIGNIFICANT CHANGE UP (ref 40–120)
ALP SERPL-CCNC: 88 U/L — SIGNIFICANT CHANGE UP (ref 30–120)
ALP SERPL-CCNC: 88 U/L — SIGNIFICANT CHANGE UP (ref 30–120)
ALT FLD-CCNC: 49 U/L — HIGH (ref 10–45)
ALT FLD-CCNC: 49 U/L — HIGH (ref 10–45)
ALT FLD-CCNC: 67 U/L — HIGH (ref 10–60)
ALT FLD-CCNC: 67 U/L — HIGH (ref 10–60)
ANION GAP SERPL CALC-SCNC: 14 MMOL/L — SIGNIFICANT CHANGE UP (ref 5–17)
APTT BLD: 26.3 SEC — SIGNIFICANT CHANGE UP (ref 24.5–35.6)
APTT BLD: 26.3 SEC — SIGNIFICANT CHANGE UP (ref 24.5–35.6)
APTT BLD: 30.9 SEC — SIGNIFICANT CHANGE UP (ref 24.5–35.6)
APTT BLD: 30.9 SEC — SIGNIFICANT CHANGE UP (ref 24.5–35.6)
AST SERPL-CCNC: 37 U/L — SIGNIFICANT CHANGE UP (ref 10–40)
AST SERPL-CCNC: 37 U/L — SIGNIFICANT CHANGE UP (ref 10–40)
AST SERPL-CCNC: 46 U/L — HIGH (ref 10–40)
AST SERPL-CCNC: 46 U/L — HIGH (ref 10–40)
BASOPHILS # BLD AUTO: 0.06 K/UL — SIGNIFICANT CHANGE UP (ref 0–0.2)
BASOPHILS # BLD AUTO: 0.06 K/UL — SIGNIFICANT CHANGE UP (ref 0–0.2)
BASOPHILS NFR BLD AUTO: 0.6 % — SIGNIFICANT CHANGE UP (ref 0–2)
BASOPHILS NFR BLD AUTO: 0.6 % — SIGNIFICANT CHANGE UP (ref 0–2)
BILIRUB SERPL-MCNC: 0.8 MG/DL — SIGNIFICANT CHANGE UP (ref 0.2–1.2)
BILIRUB SERPL-MCNC: 0.8 MG/DL — SIGNIFICANT CHANGE UP (ref 0.2–1.2)
BILIRUB SERPL-MCNC: 0.9 MG/DL — SIGNIFICANT CHANGE UP (ref 0.2–1.2)
BILIRUB SERPL-MCNC: 0.9 MG/DL — SIGNIFICANT CHANGE UP (ref 0.2–1.2)
BLD GP AB SCN SERPL QL: NEGATIVE — SIGNIFICANT CHANGE UP
BLD GP AB SCN SERPL QL: NEGATIVE — SIGNIFICANT CHANGE UP
BUN SERPL-MCNC: 20 MG/DL — SIGNIFICANT CHANGE UP (ref 7–23)
BUN SERPL-MCNC: 20 MG/DL — SIGNIFICANT CHANGE UP (ref 7–23)
BUN SERPL-MCNC: 21 MG/DL — SIGNIFICANT CHANGE UP (ref 7–23)
BUN SERPL-MCNC: 21 MG/DL — SIGNIFICANT CHANGE UP (ref 7–23)
CALCIUM SERPL-MCNC: 9.2 MG/DL — SIGNIFICANT CHANGE UP (ref 8.4–10.5)
CALCIUM SERPL-MCNC: 9.2 MG/DL — SIGNIFICANT CHANGE UP (ref 8.4–10.5)
CALCIUM SERPL-MCNC: 9.9 MG/DL — SIGNIFICANT CHANGE UP (ref 8.4–10.5)
CALCIUM SERPL-MCNC: 9.9 MG/DL — SIGNIFICANT CHANGE UP (ref 8.4–10.5)
CHLORIDE SERPL-SCNC: 102 MMOL/L — SIGNIFICANT CHANGE UP (ref 96–108)
CHLORIDE SERPL-SCNC: 102 MMOL/L — SIGNIFICANT CHANGE UP (ref 96–108)
CHLORIDE SERPL-SCNC: 97 MMOL/L — SIGNIFICANT CHANGE UP (ref 96–108)
CHLORIDE SERPL-SCNC: 97 MMOL/L — SIGNIFICANT CHANGE UP (ref 96–108)
CK MB BLD-MCNC: 0.7 % — SIGNIFICANT CHANGE UP (ref 0–3.5)
CK MB BLD-MCNC: 0.7 % — SIGNIFICANT CHANGE UP (ref 0–3.5)
CK MB BLD-MCNC: 9 % — HIGH (ref 0–3.5)
CK MB BLD-MCNC: 9 % — HIGH (ref 0–3.5)
CK MB CFR SERPL CALC: 0.8 NG/ML — SIGNIFICANT CHANGE UP (ref 0–3.6)
CK MB CFR SERPL CALC: 0.8 NG/ML — SIGNIFICANT CHANGE UP (ref 0–3.6)
CK MB CFR SERPL CALC: 29.4 NG/ML — HIGH (ref 0–6.7)
CK MB CFR SERPL CALC: 29.4 NG/ML — HIGH (ref 0–6.7)
CK SERPL-CCNC: 118 U/L — SIGNIFICANT CHANGE UP (ref 39–308)
CK SERPL-CCNC: 118 U/L — SIGNIFICANT CHANGE UP (ref 39–308)
CK SERPL-CCNC: 325 U/L — HIGH (ref 30–200)
CK SERPL-CCNC: 325 U/L — HIGH (ref 30–200)
CO2 SERPL-SCNC: 18 MMOL/L — LOW (ref 22–31)
CO2 SERPL-SCNC: 18 MMOL/L — LOW (ref 22–31)
CO2 SERPL-SCNC: 24 MMOL/L — SIGNIFICANT CHANGE UP (ref 22–31)
CO2 SERPL-SCNC: 24 MMOL/L — SIGNIFICANT CHANGE UP (ref 22–31)
CREAT SERPL-MCNC: 1.27 MG/DL — SIGNIFICANT CHANGE UP (ref 0.5–1.3)
CREAT SERPL-MCNC: 1.27 MG/DL — SIGNIFICANT CHANGE UP (ref 0.5–1.3)
CREAT SERPL-MCNC: 1.69 MG/DL — HIGH (ref 0.5–1.3)
CREAT SERPL-MCNC: 1.69 MG/DL — HIGH (ref 0.5–1.3)
EGFR: 48 ML/MIN/1.73M2 — LOW
EGFR: 48 ML/MIN/1.73M2 — LOW
EGFR: 67 ML/MIN/1.73M2 — SIGNIFICANT CHANGE UP
EGFR: 67 ML/MIN/1.73M2 — SIGNIFICANT CHANGE UP
EOSINOPHIL # BLD AUTO: 0.13 K/UL — SIGNIFICANT CHANGE UP (ref 0–0.5)
EOSINOPHIL # BLD AUTO: 0.13 K/UL — SIGNIFICANT CHANGE UP (ref 0–0.5)
EOSINOPHIL NFR BLD AUTO: 1.2 % — SIGNIFICANT CHANGE UP (ref 0–6)
EOSINOPHIL NFR BLD AUTO: 1.2 % — SIGNIFICANT CHANGE UP (ref 0–6)
ESTIMATED AVERAGE GLUCOSE: 163 MG/DL — HIGH (ref 68–114)
ESTIMATED AVERAGE GLUCOSE: 163 MG/DL — HIGH (ref 68–114)
GLUCOSE BLDC GLUCOMTR-MCNC: 115 MG/DL — HIGH (ref 70–99)
GLUCOSE BLDC GLUCOMTR-MCNC: 115 MG/DL — HIGH (ref 70–99)
GLUCOSE SERPL-MCNC: 196 MG/DL — HIGH (ref 70–99)
GLUCOSE SERPL-MCNC: 196 MG/DL — HIGH (ref 70–99)
GLUCOSE SERPL-MCNC: 230 MG/DL — HIGH (ref 70–99)
GLUCOSE SERPL-MCNC: 230 MG/DL — HIGH (ref 70–99)
HCT VFR BLD CALC: 42.4 % — SIGNIFICANT CHANGE UP (ref 39–50)
HCT VFR BLD CALC: 42.4 % — SIGNIFICANT CHANGE UP (ref 39–50)
HCT VFR BLD CALC: 44.9 % — SIGNIFICANT CHANGE UP (ref 39–50)
HCT VFR BLD CALC: 44.9 % — SIGNIFICANT CHANGE UP (ref 39–50)
HGB BLD-MCNC: 15.4 G/DL — SIGNIFICANT CHANGE UP (ref 13–17)
HGB BLD-MCNC: 15.4 G/DL — SIGNIFICANT CHANGE UP (ref 13–17)
HGB BLD-MCNC: 15.8 G/DL — SIGNIFICANT CHANGE UP (ref 13–17)
HGB BLD-MCNC: 15.8 G/DL — SIGNIFICANT CHANGE UP (ref 13–17)
IMM GRANULOCYTES NFR BLD AUTO: 0.3 % — SIGNIFICANT CHANGE UP (ref 0–0.9)
IMM GRANULOCYTES NFR BLD AUTO: 0.3 % — SIGNIFICANT CHANGE UP (ref 0–0.9)
INR BLD: 0.96 RATIO — SIGNIFICANT CHANGE UP (ref 0.85–1.18)
INR BLD: 0.96 RATIO — SIGNIFICANT CHANGE UP (ref 0.85–1.18)
INR BLD: 0.97 RATIO — SIGNIFICANT CHANGE UP (ref 0.85–1.18)
INR BLD: 0.97 RATIO — SIGNIFICANT CHANGE UP (ref 0.85–1.18)
LYMPHOCYTES # BLD AUTO: 28.1 % — SIGNIFICANT CHANGE UP (ref 13–44)
LYMPHOCYTES # BLD AUTO: 28.1 % — SIGNIFICANT CHANGE UP (ref 13–44)
LYMPHOCYTES # BLD AUTO: 3.03 K/UL — SIGNIFICANT CHANGE UP (ref 1–3.3)
LYMPHOCYTES # BLD AUTO: 3.03 K/UL — SIGNIFICANT CHANGE UP (ref 1–3.3)
MAGNESIUM SERPL-MCNC: 1.9 MG/DL — SIGNIFICANT CHANGE UP (ref 1.6–2.6)
MAGNESIUM SERPL-MCNC: 1.9 MG/DL — SIGNIFICANT CHANGE UP (ref 1.6–2.6)
MCHC RBC-ENTMCNC: 31.2 PG — SIGNIFICANT CHANGE UP (ref 27–34)
MCHC RBC-ENTMCNC: 31.2 PG — SIGNIFICANT CHANGE UP (ref 27–34)
MCHC RBC-ENTMCNC: 32.3 PG — SIGNIFICANT CHANGE UP (ref 27–34)
MCHC RBC-ENTMCNC: 32.3 PG — SIGNIFICANT CHANGE UP (ref 27–34)
MCHC RBC-ENTMCNC: 35.2 GM/DL — SIGNIFICANT CHANGE UP (ref 32–36)
MCHC RBC-ENTMCNC: 35.2 GM/DL — SIGNIFICANT CHANGE UP (ref 32–36)
MCHC RBC-ENTMCNC: 36.3 GM/DL — HIGH (ref 32–36)
MCHC RBC-ENTMCNC: 36.3 GM/DL — HIGH (ref 32–36)
MCV RBC AUTO: 88.6 FL — SIGNIFICANT CHANGE UP (ref 80–100)
MCV RBC AUTO: 88.6 FL — SIGNIFICANT CHANGE UP (ref 80–100)
MCV RBC AUTO: 88.9 FL — SIGNIFICANT CHANGE UP (ref 80–100)
MCV RBC AUTO: 88.9 FL — SIGNIFICANT CHANGE UP (ref 80–100)
MONOCYTES # BLD AUTO: 0.88 K/UL — SIGNIFICANT CHANGE UP (ref 0–0.9)
MONOCYTES # BLD AUTO: 0.88 K/UL — SIGNIFICANT CHANGE UP (ref 0–0.9)
MONOCYTES NFR BLD AUTO: 8.2 % — SIGNIFICANT CHANGE UP (ref 2–14)
MONOCYTES NFR BLD AUTO: 8.2 % — SIGNIFICANT CHANGE UP (ref 2–14)
NEUTROPHILS # BLD AUTO: 6.66 K/UL — SIGNIFICANT CHANGE UP (ref 1.8–7.4)
NEUTROPHILS # BLD AUTO: 6.66 K/UL — SIGNIFICANT CHANGE UP (ref 1.8–7.4)
NEUTROPHILS NFR BLD AUTO: 61.6 % — SIGNIFICANT CHANGE UP (ref 43–77)
NEUTROPHILS NFR BLD AUTO: 61.6 % — SIGNIFICANT CHANGE UP (ref 43–77)
NRBC # BLD: 0 /100 WBCS — SIGNIFICANT CHANGE UP (ref 0–0)
PHOSPHATE SERPL-MCNC: 2.6 MG/DL — SIGNIFICANT CHANGE UP (ref 2.5–4.5)
PHOSPHATE SERPL-MCNC: 2.6 MG/DL — SIGNIFICANT CHANGE UP (ref 2.5–4.5)
PLATELET # BLD AUTO: 209 K/UL — SIGNIFICANT CHANGE UP (ref 150–400)
POTASSIUM SERPL-MCNC: 4.2 MMOL/L — SIGNIFICANT CHANGE UP (ref 3.5–5.3)
POTASSIUM SERPL-MCNC: 4.2 MMOL/L — SIGNIFICANT CHANGE UP (ref 3.5–5.3)
POTASSIUM SERPL-MCNC: 4.4 MMOL/L — SIGNIFICANT CHANGE UP (ref 3.5–5.3)
POTASSIUM SERPL-MCNC: 4.4 MMOL/L — SIGNIFICANT CHANGE UP (ref 3.5–5.3)
POTASSIUM SERPL-SCNC: 4.2 MMOL/L — SIGNIFICANT CHANGE UP (ref 3.5–5.3)
POTASSIUM SERPL-SCNC: 4.2 MMOL/L — SIGNIFICANT CHANGE UP (ref 3.5–5.3)
POTASSIUM SERPL-SCNC: 4.4 MMOL/L — SIGNIFICANT CHANGE UP (ref 3.5–5.3)
POTASSIUM SERPL-SCNC: 4.4 MMOL/L — SIGNIFICANT CHANGE UP (ref 3.5–5.3)
PROT SERPL-MCNC: 7 G/DL — SIGNIFICANT CHANGE UP (ref 6–8.3)
PROT SERPL-MCNC: 7 G/DL — SIGNIFICANT CHANGE UP (ref 6–8.3)
PROT SERPL-MCNC: 7.7 G/DL — SIGNIFICANT CHANGE UP (ref 6–8.3)
PROT SERPL-MCNC: 7.7 G/DL — SIGNIFICANT CHANGE UP (ref 6–8.3)
PROTHROM AB SERPL-ACNC: 10.7 SEC — SIGNIFICANT CHANGE UP (ref 9.5–13)
PROTHROM AB SERPL-ACNC: 10.7 SEC — SIGNIFICANT CHANGE UP (ref 9.5–13)
PROTHROM AB SERPL-ACNC: 10.8 SEC — SIGNIFICANT CHANGE UP (ref 9.5–13)
PROTHROM AB SERPL-ACNC: 10.8 SEC — SIGNIFICANT CHANGE UP (ref 9.5–13)
RBC # BLD: 4.77 M/UL — SIGNIFICANT CHANGE UP (ref 4.2–5.8)
RBC # BLD: 4.77 M/UL — SIGNIFICANT CHANGE UP (ref 4.2–5.8)
RBC # BLD: 5.07 M/UL — SIGNIFICANT CHANGE UP (ref 4.2–5.8)
RBC # BLD: 5.07 M/UL — SIGNIFICANT CHANGE UP (ref 4.2–5.8)
RBC # FLD: 11.9 % — SIGNIFICANT CHANGE UP (ref 10.3–14.5)
RH IG SCN BLD-IMP: POSITIVE — SIGNIFICANT CHANGE UP
RH IG SCN BLD-IMP: POSITIVE — SIGNIFICANT CHANGE UP
SODIUM SERPL-SCNC: 134 MMOL/L — LOW (ref 135–145)
SODIUM SERPL-SCNC: 134 MMOL/L — LOW (ref 135–145)
SODIUM SERPL-SCNC: 135 MMOL/L — SIGNIFICANT CHANGE UP (ref 135–145)
SODIUM SERPL-SCNC: 135 MMOL/L — SIGNIFICANT CHANGE UP (ref 135–145)
TROPONIN I, HIGH SENSITIVITY RESULT: 27 NG/L — SIGNIFICANT CHANGE UP
TROPONIN I, HIGH SENSITIVITY RESULT: 27 NG/L — SIGNIFICANT CHANGE UP
TROPONIN T, HIGH SENSITIVITY RESULT: 1077 NG/L — HIGH (ref 0–51)
TROPONIN T, HIGH SENSITIVITY RESULT: 1077 NG/L — HIGH (ref 0–51)
WBC # BLD: 10.79 K/UL — HIGH (ref 3.8–10.5)
WBC # BLD: 10.79 K/UL — HIGH (ref 3.8–10.5)
WBC # BLD: 11.52 K/UL — HIGH (ref 3.8–10.5)
WBC # BLD: 11.52 K/UL — HIGH (ref 3.8–10.5)
WBC # FLD AUTO: 10.79 K/UL — HIGH (ref 3.8–10.5)
WBC # FLD AUTO: 10.79 K/UL — HIGH (ref 3.8–10.5)
WBC # FLD AUTO: 11.52 K/UL — HIGH (ref 3.8–10.5)
WBC # FLD AUTO: 11.52 K/UL — HIGH (ref 3.8–10.5)

## 2023-11-11 PROCEDURE — 82962 GLUCOSE BLOOD TEST: CPT

## 2023-11-11 PROCEDURE — 71045 X-RAY EXAM CHEST 1 VIEW: CPT

## 2023-11-11 PROCEDURE — 92941 PRQ TRLML REVSC TOT OCCL AMI: CPT | Mod: LD

## 2023-11-11 PROCEDURE — 93010 ELECTROCARDIOGRAM REPORT: CPT | Mod: 77

## 2023-11-11 PROCEDURE — 85025 COMPLETE CBC W/AUTO DIFF WBC: CPT

## 2023-11-11 PROCEDURE — 99152 MOD SED SAME PHYS/QHP 5/>YRS: CPT

## 2023-11-11 PROCEDURE — 82550 ASSAY OF CK (CPK): CPT

## 2023-11-11 PROCEDURE — 71045 X-RAY EXAM CHEST 1 VIEW: CPT | Mod: 26

## 2023-11-11 PROCEDURE — 93005 ELECTROCARDIOGRAM TRACING: CPT

## 2023-11-11 PROCEDURE — 93010 ELECTROCARDIOGRAM REPORT: CPT

## 2023-11-11 PROCEDURE — 99291 CRITICAL CARE FIRST HOUR: CPT | Mod: GC,25

## 2023-11-11 PROCEDURE — 93306 TTE W/DOPPLER COMPLETE: CPT | Mod: 26

## 2023-11-11 PROCEDURE — 85730 THROMBOPLASTIN TIME PARTIAL: CPT

## 2023-11-11 PROCEDURE — 96375 TX/PRO/DX INJ NEW DRUG ADDON: CPT

## 2023-11-11 PROCEDURE — 93458 L HRT ARTERY/VENTRICLE ANGIO: CPT | Mod: 26,59

## 2023-11-11 PROCEDURE — 84484 ASSAY OF TROPONIN QUANT: CPT

## 2023-11-11 PROCEDURE — 36415 COLL VENOUS BLD VENIPUNCTURE: CPT

## 2023-11-11 PROCEDURE — 85610 PROTHROMBIN TIME: CPT

## 2023-11-11 PROCEDURE — 99291 CRITICAL CARE FIRST HOUR: CPT | Mod: 25

## 2023-11-11 PROCEDURE — 99291 CRITICAL CARE FIRST HOUR: CPT

## 2023-11-11 PROCEDURE — 80053 COMPREHEN METABOLIC PANEL: CPT

## 2023-11-11 PROCEDURE — 82553 CREATINE MB FRACTION: CPT

## 2023-11-11 PROCEDURE — 96374 THER/PROPH/DIAG INJ IV PUSH: CPT

## 2023-11-11 PROCEDURE — 92978 ENDOLUMINL IVUS OCT C 1ST: CPT | Mod: 26,LD

## 2023-11-11 RX ORDER — ATORVASTATIN CALCIUM 80 MG/1
80 TABLET, FILM COATED ORAL AT BEDTIME
Refills: 0 | Status: DISCONTINUED | OUTPATIENT
Start: 2023-11-11 | End: 2023-11-12

## 2023-11-11 RX ORDER — LOSARTAN POTASSIUM 100 MG/1
100 TABLET, FILM COATED ORAL DAILY
Refills: 0 | Status: DISCONTINUED | OUTPATIENT
Start: 2023-11-11 | End: 2023-11-11

## 2023-11-11 RX ORDER — ACETAMINOPHEN 500 MG
1000 TABLET ORAL ONCE
Refills: 0 | Status: COMPLETED | OUTPATIENT
Start: 2023-11-11 | End: 2023-11-11

## 2023-11-11 RX ORDER — NITROGLYCERIN 6.5 MG
0.4 CAPSULE, EXTENDED RELEASE ORAL ONCE
Refills: 0 | Status: COMPLETED | OUTPATIENT
Start: 2023-11-11 | End: 2023-11-11

## 2023-11-11 RX ORDER — METOPROLOL TARTRATE 50 MG
12.5 TABLET ORAL
Refills: 0 | Status: DISCONTINUED | OUTPATIENT
Start: 2023-11-11 | End: 2023-11-12

## 2023-11-11 RX ORDER — HEPARIN SODIUM 5000 [USP'U]/ML
INJECTION INTRAVENOUS; SUBCUTANEOUS
Qty: 25000 | Refills: 0 | Status: DISCONTINUED | OUTPATIENT
Start: 2023-11-11 | End: 2023-11-14

## 2023-11-11 RX ORDER — CHLORHEXIDINE GLUCONATE 213 G/1000ML
1 SOLUTION TOPICAL ONCE
Refills: 0 | Status: DISCONTINUED | OUTPATIENT
Start: 2023-11-11 | End: 2023-11-14

## 2023-11-11 RX ORDER — ASPIRIN/CALCIUM CARB/MAGNESIUM 324 MG
324 TABLET ORAL ONCE
Refills: 0 | Status: COMPLETED | OUTPATIENT
Start: 2023-11-11 | End: 2023-11-11

## 2023-11-11 RX ORDER — ASPIRIN/CALCIUM CARB/MAGNESIUM 324 MG
325 TABLET ORAL ONCE
Refills: 0 | Status: DISCONTINUED | OUTPATIENT
Start: 2023-11-11 | End: 2023-11-11

## 2023-11-11 RX ORDER — HEPARIN SODIUM 5000 [USP'U]/ML
5000 INJECTION INTRAVENOUS; SUBCUTANEOUS EVERY 8 HOURS
Refills: 0 | Status: DISCONTINUED | OUTPATIENT
Start: 2023-11-11 | End: 2023-11-12

## 2023-11-11 RX ORDER — HEPARIN SODIUM 5000 [USP'U]/ML
4000 INJECTION INTRAVENOUS; SUBCUTANEOUS EVERY 6 HOURS
Refills: 0 | Status: DISCONTINUED | OUTPATIENT
Start: 2023-11-11 | End: 2023-11-14

## 2023-11-11 RX ORDER — TICAGRELOR 90 MG/1
180 TABLET ORAL ONCE
Refills: 0 | Status: COMPLETED | OUTPATIENT
Start: 2023-11-11 | End: 2023-11-11

## 2023-11-11 RX ORDER — ASPIRIN/CALCIUM CARB/MAGNESIUM 324 MG
81 TABLET ORAL DAILY
Refills: 0 | Status: DISCONTINUED | OUTPATIENT
Start: 2023-11-12 | End: 2023-11-12

## 2023-11-11 RX ORDER — INSULIN LISPRO 100/ML
VIAL (ML) SUBCUTANEOUS
Refills: 0 | Status: DISCONTINUED | OUTPATIENT
Start: 2023-11-11 | End: 2023-11-12

## 2023-11-11 RX ORDER — HEPARIN SODIUM 5000 [USP'U]/ML
4000 INJECTION INTRAVENOUS; SUBCUTANEOUS ONCE
Refills: 0 | Status: COMPLETED | OUTPATIENT
Start: 2023-11-11 | End: 2023-11-11

## 2023-11-11 RX ORDER — TICAGRELOR 90 MG/1
90 TABLET ORAL EVERY 12 HOURS
Refills: 0 | Status: DISCONTINUED | OUTPATIENT
Start: 2023-11-11 | End: 2023-11-12

## 2023-11-11 RX ORDER — AMLODIPINE BESYLATE 2.5 MG/1
10 TABLET ORAL DAILY
Refills: 0 | Status: DISCONTINUED | OUTPATIENT
Start: 2023-11-11 | End: 2023-11-11

## 2023-11-11 RX ORDER — METFORMIN HYDROCHLORIDE 850 MG/1
1 TABLET ORAL
Refills: 0 | DISCHARGE

## 2023-11-11 RX ORDER — LORATADINE 10 MG/1
10 TABLET ORAL DAILY
Refills: 0 | Status: DISCONTINUED | OUTPATIENT
Start: 2023-11-11 | End: 2023-11-12

## 2023-11-11 RX ADMIN — HEPARIN SODIUM 5000 UNIT(S): 5000 INJECTION INTRAVENOUS; SUBCUTANEOUS at 21:21

## 2023-11-11 RX ADMIN — Medication 0.4 MILLIGRAM(S): at 10:55

## 2023-11-11 RX ADMIN — TICAGRELOR 180 MILLIGRAM(S): 90 TABLET ORAL at 10:25

## 2023-11-11 RX ADMIN — Medication 1: at 18:01

## 2023-11-11 RX ADMIN — HEPARIN SODIUM 4000 UNIT(S): 5000 INJECTION INTRAVENOUS; SUBCUTANEOUS at 10:27

## 2023-11-11 RX ADMIN — HEPARIN SODIUM 1000 UNIT(S)/HR: 5000 INJECTION INTRAVENOUS; SUBCUTANEOUS at 10:28

## 2023-11-11 RX ADMIN — TICAGRELOR 90 MILLIGRAM(S): 90 TABLET ORAL at 18:04

## 2023-11-11 RX ADMIN — Medication 400 MILLIGRAM(S): at 10:35

## 2023-11-11 RX ADMIN — ATORVASTATIN CALCIUM 80 MILLIGRAM(S): 80 TABLET, FILM COATED ORAL at 21:21

## 2023-11-11 RX ADMIN — Medication 324 MILLIGRAM(S): at 10:35

## 2023-11-11 RX ADMIN — Medication 12.5 MILLIGRAM(S): at 18:03

## 2023-11-11 NOTE — ED PROVIDER NOTE - OBJECTIVE STATEMENT
53-year-old male with PMH of DM 2, HTN, presents to the emergency department with wife complaining of chest pressure and shortness of breath.  Patient states that 30 a.m. he was on a treadmill at the gym, was running for 10 minutes and then developed shortness of breath, drove home, upon arrival to his house he felt he was going to pass out and shortness of breath and chest pressure were getting worse and his wife brought him to the emergency department.

## 2023-11-11 NOTE — CHART NOTE - NSCHARTNOTEFT_GEN_A_CORE
Initial ECG at [facility]: 1012  Called by Transfer Center: 10:25  Called Cath Attending: 10:31  Patient arrival to [facility]: 11:19    Reason for consult: STEMI    HPI received from ED attending at [facility]: 54yo male with PMH of HTN, DM presenting with chest pressure and dyspnea that developed at ~0930, 10 minutes into walking on the treadmill at the gym. Patient stopped exercising and went home, however developed worsening dyspnea, chest discomfort, diaphoresis and pre-syncope. Was driven to the ED by his wife where EKG showed anterior ST elevations. The interventionalist on call, Dr. Warren, was notified, cath lab team was activated and the CICU staff also notified. Pt was loaded with ASA, Brilinta and started on Heparin by [facility] per report. Pt transferred directly to the cath lab.       PMHx:   No pertinent past medical history        PSHx:   No significant past surgical history        Allergies:  No Known Allergies        Physical Exam:  T(F): 97.4 (11-11), Max: 97.4 (11-11)  HR: 84 (11-11) (75 - 84)  BP: 120/58 (11-11) (120/58 - 130/65)  RR: 24 (11-11)  SpO2: 100% (11-11)                              15.8   10.79 )-----------( 209      ( 11 Nov 2023 10:20 )             44.9     11-11    135  |  97  |  21  ----------------------------<  230<H>  4.2   |  24  |  1.69<H>    Ca    9.9      11 Nov 2023 10:25    TPro  7.7  /  Alb  4.0  /  TBili  0.9  /  DBili  x   /  AST  37  /  ALT  67<H>  /  AlkPhos  88  11-11    PT/INR - ( 11 Nov 2023 10:20 )   PT: 10.8 sec;   INR: 0.96 ratio         PTT - ( 11 Nov 2023 10:20 )  PTT:26.3 sec  CARDIAC MARKERS ( 11 Nov 2023 10:20 )  x     / x     / 118 U/L / x     / 0.8 ng/mL    Impression and recommendations   - Patient presented to [facility] ER with c/o chest pain. EKG revealed anterior ST elevation  - Given active chest pain and SABINE, s/p urgent cath. ASA/Brilinta and heparin loaded in ED  - Transferred directly to the [facility] cath lab   - Admit to CCU  - Assess for resolution of chest pain and for recurrent chest pain  - Serial EKG to assess for resolution of ST changes  - Monitor vital signs to monitor hemodynamic stability  - Continuous cardiac monitoring to monitor for arrhythmias  - Please obtain serial cardiac enzymes, Troponin to peak and  risk factor profile to include TSH, HGBA1c, Lipid profile, CMP, Mag, Phos, CBC, pBNP. Serial EKG, PRN chest pain  - Echo  to evaluate LV function and wall motion abnormality  - Bedrest.   - Aspirin 81 PO daily, Brilinta 90mg BID, Lipitor 80 mg PO daily  - Post cardiac cath care as per protocol.   - Check radial/groin site for hematoma or bleeding.  - Introduce beta blockers as tolerated: Metoprolol 12.5 mg BID  - Introduce ACE as tolerated. Hold for now due to recent dye load due to cardiac cath.  - Low fat DASH diet  - ECHO: 2D ECHO in 3-4 days to reevaluate LV function and to r/o thrombus.    Susan Lanier MD   Cardiology Fellow     This consult note is preliminary until cosigned by the attending cardiologist.

## 2023-11-11 NOTE — ED ADULT TRIAGE NOTE - CHIEF COMPLAINT QUOTE
short of breathe after working out no chest pain also c/o congestion  received  2nd shingles vaccine last night

## 2023-11-11 NOTE — PROGRESS NOTE ADULT - SUBJECTIVE AND OBJECTIVE BOX
Patient is a 53y old  Male who presents with a chief complaint of STEMI (11 Nov 2023 13:31)      INTERVAL HISTORY:  -    SUBJECTIVE  - Patient seen and evaluated at bedside.     MEDICATIONS:  MEDICATIONS  (STANDING):  atorvastatin 80 milliGRAM(s) Oral at bedtime  heparin   Injectable 5000 Unit(s) SubCutaneous every 8 hours  insulin lispro (ADMELOG) corrective regimen sliding scale   SubCutaneous three times a day before meals  loratadine 10 milliGRAM(s) Oral daily  metoprolol tartrate 12.5 milliGRAM(s) Oral two times a day  ticagrelor 90 milliGRAM(s) Oral every 12 hours    MEDICATIONS  (PRN):      OBJECTIVE:  ICU Vital Signs Last 24 Hrs  T(C): 36.4 (11 Nov 2023 12:30), Max: 36.4 (11 Nov 2023 12:30)  T(F): 97.5 (11 Nov 2023 12:30), Max: 97.5 (11 Nov 2023 12:30)  HR: 104 (11 Nov 2023 18:00) (75 - 104)  BP: 127/78 (11 Nov 2023 18:00) (115/82 - 146/78)  BP(mean): 98 (11 Nov 2023 18:00) (91 - 106)  ABP: --  ABP(mean): --  RR: 24 (11 Nov 2023 18:00) (17 - 48)  SpO2: 95% (11 Nov 2023 18:00) (95% - 100%)    O2 Parameters below as of 11 Nov 2023 18:00  Patient On (Oxygen Delivery Method): room air            Adult Advanced Hemodynamics Last 24 Hrs  CVP(mm Hg): --  CVP(cm H2O): --  CO: --  CI: --  PA: --  PA(mean): --  PCWP: --  SVR: --  SVRI: --  PVR: --  PVRI: --  CAPILLARY BLOOD GLUCOSE      POCT Blood Glucose.: 199 mg/dL (11 Nov 2023 10:17)    CAPILLARY BLOOD GLUCOSE      POCT Blood Glucose.: 199 mg/dL (11 Nov 2023 10:17)    I&O's Summary    11 Nov 2023 07:01  -  11 Nov 2023 19:01  --------------------------------------------------------  IN: 480 mL / OUT: 1800 mL / NET: -1320 mL      Daily Height in cm: 182.88 (11 Nov 2023 12:30)    Daily     PHYSICAL EXAM:  General: NAD, well-groomed, well-developed  Eyes: Conjunctiva and sclera clear  ENMT: Moist mucous membranes  Neck: Supple  Chest: Clear to auscultation bilaterally; no rales, rhonchi, or wheezing  Heart: Regular rate and rhythm; normal S1 and S2; no murmurs, rubs, or gallops  Abd: Soft, nontender, nondistended  Nervous System: AAOX3  Ext: no peripheral LE edema bilaterally  Lines/Tubes: IV peripheral    LABS:                          15.4   11.52 )-----------( 209      ( 11 Nov 2023 17:02 )             42.4     11-11    134<L>  |  102  |  20  ----------------------------<  196<H>  4.4   |  18<L>  |  1.27    Ca    9.2      11 Nov 2023 17:02  Phos  2.6     11-11  Mg     1.9     11-11    TPro  7.0  /  Alb  4.0  /  TBili  0.8  /  DBili  x   /  AST  46<H>  /  ALT  49<H>  /  AlkPhos  86  11-11    LIVER FUNCTIONS - ( 11 Nov 2023 17:02 )  Alb: 4.0 g/dL / Pro: 7.0 g/dL / ALK PHOS: 86 U/L / ALT: 49 U/L / AST: 46 U/L / GGT: x           PT/INR - ( 11 Nov 2023 17:02 )   PT: 10.7 sec;   INR: 0.97 ratio         PTT - ( 11 Nov 2023 17:02 )  PTT:30.9 sec  CKMB Units: 29.4 ng/mL (11-11 @ 17:02)  Creatine Kinase, Serum: 325 U/L (11-11 @ 17:02)  Creatine Kinase, Serum: 118 U/L (11-11 @ 10:20)  CKMB Units: 0.8 ng/mL (11-11 @ 10:20)    CARDIAC MARKERS ( 11 Nov 2023 17:02 )  x     / x     / 325 U/L / x     / 29.4 ng/mL  CARDIAC MARKERS ( 11 Nov 2023 10:20 )  x     / x     / 118 U/L / x     / 0.8 ng/mL      Urinalysis Basic - ( 11 Nov 2023 17:02 )    Color: x / Appearance: x / SG: x / pH: x  Gluc: 196 mg/dL / Ketone: x  / Bili: x / Urobili: x   Blood: x / Protein: x / Nitrite: x   Leuk Esterase: x / RBC: x / WBC x   Sq Epi: x / Non Sq Epi: x / Bacteria: x          Plan:  ====================== NEUROLOGY=====================  - continue to monitor mental status as per protocol     ==================== RESPIRATORY======================  - continue to monitor SpO2 with goal >94%    Mechanical Vent:     ====================CARDIOVASCULAR==================      ===================== RENAL =========================  - Continue monitoring urine output, lytes, SCr/ BUN  - replete lytes prn with goal K >4 and Mg >2    =============== GASTROINTESTINAL===================      ===================ENDO====================      ===================HEMATOLOGIC/ONC ===================  - Monitor H/H and plts  - DVT PPX:     ==================INFECTIOUS DISEASE================  - monitor and trend WBC and temperature curve     Dispo:    Patient requires continuous monitoring with bedside rhythm monitoring, arterial line, pulse oximetry, ventilator monitoring and intermittent blood gas analysis.  Care plan discussed with ICU care team.  I have spent 35 minutes providing critical care, in addition to initial critical time provided by CICU attending Dr. Morocho, re-evaluated multiple times during the day.    Robina Christensen PA-C Patient is a 53y old  Male who presents with a chief complaint of STEMI (11 Nov 2023 13:31)    INTERVAL HISTORY:  [facility]    SUBJECTIVE  - Patient seen and evaluated at bedside.     MEDICATIONS:  MEDICATIONS  (STANDING):  atorvastatin 80 milliGRAM(s) Oral at bedtime  heparin   Injectable 5000 Unit(s) SubCutaneous every 8 hours  insulin lispro (ADMELOG) corrective regimen sliding scale   SubCutaneous three times a day before meals  loratadine 10 milliGRAM(s) Oral daily  metoprolol tartrate 12.5 milliGRAM(s) Oral two times a day  ticagrelor 90 milliGRAM(s) Oral every 12 hours    OBJECTIVE:  ICU Vital Signs Last 24 Hrs  T(C): 36.4 (11 Nov 2023 12:30), Max: 36.4 (11 Nov 2023 12:30)  T(F): 97.5 (11 Nov 2023 12:30), Max: 97.5 (11 Nov 2023 12:30)  HR: 104 (11 Nov 2023 18:00) (75 - 104)  BP: 127/78 (11 Nov 2023 18:00) (115/82 - 146/78)  BP(mean): 98 (11 Nov 2023 18:00) (91 - 106)  RR: 24 (11 Nov 2023 18:00) (17 - 48)  SpO2: 95% (11 Nov 2023 18:00) (95% - 100%)    O2 Parameters below as of 11 Nov 2023 18:00  Patient On (Oxygen Delivery Method): room air    CAPILLARY BLOOD GLUCOSE  POCT Blood Glucose.: 199 mg/dL (11 Nov 2023 10:17)    CAPILLARY BLOOD GLUCOSE  POCT Blood Glucose.: 199 mg/dL (11 Nov 2023 10:17)    I&O's Summary    11 Nov 2023 07:01  -  11 Nov 2023 19:01  --------------------------------------------------------  IN: 480 mL / OUT: 1800 mL / NET: -1320 mL      Daily Height in cm: 182.88 (11 Nov 2023 12:30)      PHYSICAL EXAM:  General: NAD  Chest: Clear to auscultation bilaterally; no rales, rhonchi, or wheezing  Heart: Regular rate and rhythm; normal S1 and S2; no murmurs, rubs, or gallops  Abd: Soft, nontender, nondistended  Nervous System: AAOX3  Ext: no peripheral LE edema bilaterally    LABS:                        15.4   11.52 )-----------( 209      ( 11 Nov 2023 17:02 )             42.4     11-11    134<L>  |  102  |  20  ----------------------------<  196<H>  4.4   |  18<L>  |  1.27    Ca    9.2      11 Nov 2023 17:02  Phos  2.6     11-11  Mg     1.9     11-11    TPro  7.0  /  Alb  4.0  /  TBili  0.8  /  DBili  x   /  AST  46<H>  /  ALT  49<H>  /  AlkPhos  86  11-11    LIVER FUNCTIONS - ( 11 Nov 2023 17:02 )  Alb: 4.0 g/dL / Pro: 7.0 g/dL / ALK PHOS: 86 U/L / ALT: 49 U/L / AST: 46 U/L / GGT: x           PT/INR - ( 11 Nov 2023 17:02 )   PT: 10.7 sec;   INR: 0.97 ratio         PTT - ( 11 Nov 2023 17:02 )  PTT:30.9 sec  CKMB Units: 29.4 ng/mL (11-11 @ 17:02)  Creatine Kinase, Serum: 325 U/L (11-11 @ 17:02)  Creatine Kinase, Serum: 118 U/L (11-11 @ 10:20)  CKMB Units: 0.8 ng/mL (11-11 @ 10:20)    CARDIAC MARKERS ( 11 Nov 2023 17:02 )  x     / x     / 325 U/L / x     / 29.4 ng/mL  CARDIAC MARKERS ( 11 Nov 2023 10:20 )  x     / x     / 118 U/L / x     / 0.8 ng/mL      Urinalysis Basic - ( 11 Nov 2023 17:02 )    Color: x / Appearance: x / SG: x / pH: x  Gluc: 196 mg/dL / Ketone: x  / Bili: x / Urobili: x   Blood: x / Protein: x / Nitrite: x   Leuk Esterase: x / RBC: x / WBC x   Sq Epi: x / Non Sq Epi: x / Bacteria: x      Assessment: 52yo male with PMH of HTN, DM that presented to [facility] ED with chest pain after exercising in the treadmill. EKG showed signs of ST elevations in the anterior leads. Transferred to [facility] for urgent cath, now s/p 1x NADER to mid-LAD.      Plan:  ====================== NEUROLOGY=====================  A&Ox3  - continue to monitor mental status as per protocol     ==================== RESPIRATORY======================  Stable on room air  - continue to monitor SpO2 with goal >94%    ====================CARDIOVASCULAR==================  STEMI  - EKG in [place] showing ST segment elevations in leads  - 11/11 PCI: pLAD 99% s/p x1 NADER  - S/p x1 NADER to mid-LAD  - C/w ASA and Brillinta  - C/w Lipitor  - C/w Lopressor 12.5 BID, uptitrate GDMT as tolerated  - trend CE to peak  - 11/11 TTE: Hypokinesis of distal septum and anteroapex, overall LVSF preserved. Unable to assess EF.  - Repeat TTE in AM to assess EF    HTN  - Lopressor 12.5 BID  - Hold Losartan iso SHELLI  - Hold Amlodipine    ===================== RENAL =========================  SHELLI, resolving   - Cr downtrending 1.27 (1.68)  - Continue monitoring urine output, lytes, SCr/ BUN  - replete lytes prn with goal K >4 and Mg >2    =============== GASTROINTESTINAL===================  DASH diet    ===================ENDO====================  DM  - ISS  - f/u A1c    F/u TSH, lipid panel     ===================HEMATOLOGIC/ONC ===================  H/H & plts stable  - Monitor H/H and plts  - DVT PPX: HSQ    ==================INFECTIOUS DISEASE================  No acute issues  - afebrile   - leukocytosis, likely reactive i/s/o STEMI  - monitor and trend WBC and temperature curve     Dispo: Stable for downgrade from ICU at this time.     Patient requires continuous monitoring with bedside rhythm monitoring, arterial line, pulse oximetry, ventilator monitoring and intermittent blood gas analysis.  Care plan discussed with ICU care team.  I have spent 35 minutes providing critical care, in addition to initial critical time provided by CICU attending Dr. Morocho, re-evaluated multiple times during the day.    Robina Christensen PA-C

## 2023-11-11 NOTE — ED ADULT NURSE NOTE - NSFALLUNIVINTERV_ED_ALL_ED
Bed/Stretcher in lowest position, wheels locked, appropriate side rails in place/Call bell, personal items and telephone in reach/Instruct patient to call for assistance before getting out of bed/chair/stretcher/Non-slip footwear applied when patient is off stretcher/[place] to call system/Physically safe environment - no spills, clutter or unnecessary equipment/Purposeful proactive rounding/Room/bathroom lighting operational, light cord in reach

## 2023-11-11 NOTE — H&P ADULT - ASSESSMENT
Assessment & Plan     =========NEURO=========  #PIA  - AOx3   - CTM per protocol    =========CARDIOVASCULAR=========  #STEMI  - EKG in [place] showing ST segment elevations in leads  - Troponin 27  - Loaded with ASA and Brillinta  -     =========PULMONARY=========   #PIA  - No acute complaints   - Maintain SpO2 > 94%    =========INFECTIOUS DISEASE=========  #Leukocytosis  - Most likely iso STEMI  - Afebrile  - CTM WBC and temperature curve    =========GI=========  DASH diet    =========RENAL=========  #PIA  - Continue to monitor SCr, BUN  - Replete     =========Endocrine=========       =========Hematology=========     =========DVT AND GI PROPHYLAXIS=========    #Code Status: Full code     Assessment & Plan  54yo male with PMH of HTN, DM that presented to OSH ED with chest pain after exercising in the treadmill. EKG showed signs of ST elevations in the anterior leads. Transferred to [facility] for urgent cath, now s/p 1x NADER to mid-LAD.    =========NEURO=========  #PIA  - AOx3   - CTM per protocol    =========CARDIOVASCULAR=========  #STEMI  - EKG in [place] showing ST segment elevations in leads  - Troponin 27  - S/p 1 NADER to mid-LAD  - Loaded with ASA and Brillinta  - C/w ASA and Brillinta  - C/w Lipitor  - Obtain repeat trop    #HTN  - C/w losartan and amlodipine     =========PULMONARY=========   #PIA  - No acute complaints   - Maintain SpO2 > 94%    =========INFECTIOUS DISEASE=========  #Leukocytosis  - Most likely iso STEMI  - Afebrile  - CTM WBC and temperature curve    =========GI=========  DASH diet    =========RENAL=========  #PIA  - Continue to monitor SCr, BUN  - Replete     =========Endocrine=========   #T2DM  - Hold home metformin  - ISS     =========Hematology=========   #PIA  - H&H stable  - CTM    =========DVT AND GI PROPHYLAXIS=========  -      Assessment & Plan  52yo male with PMH of HTN, DM that presented to OSH ED with chest pain after exercising in the treadmill. EKG showed signs of ST elevations in the anterior leads. Transferred to [facility] for urgent cath, now s/p 1x NADER to mid-LAD.    =========NEURO=========  #PIA  - AOx3   - CTM per protocol    =========CARDIOVASCULAR=========  #STEMI  - EKG in [place] showing ST segment elevations in leads  - Troponin 27  - S/p 1 NADER to mid-LAD  - Loaded with ASA and Brillinta  - C/w ASA and Brillinta  - C/w Lipitor  - Obtain repeat trop  - F/u TTE    #HTN  - C/w losartan and amlodipine     =========PULMONARY=========   #PIA  - No acute complaints   - Maintain SpO2 > 94%    =========INFECTIOUS DISEASE=========  #Leukocytosis  - Most likely iso STEMI  - Afebrile  - CTM WBC and temperature curve    =========GI=========  DASH diet    =========RENAL=========  #SHELLI  - SCr 1.61, unclear baseline  - Continue to monitor SCr, BUN  - Replete     =========Endocrine=========   #T2DM  - Hold home metformin  - ISS     - f/u TSH, lipid panel, A1c    =========Hematology=========   #PIA  - H&H stable  - CTM    =========DVT AND GI PROPHYLAXIS=========  -      Assessment & Plan  54yo male with PMH of HTN, DM that presented to OSH ED with chest pain after exercising in the treadmill. EKG showed signs of ST elevations in the anterior leads. Transferred to [facility] for urgent cath, now s/p 1x NADER to mid-LAD.    =========NEURO=========  #PIA  - AOx3   - CTM per protocol    =========CARDIOVASCULAR=========  #STEMI  - EKG in [place] showing ST segment elevations in leads  - Troponin 27  - S/p 1 NADER to mid-LAD  - Loaded with ASA and Brillinta  - C/w ASA and Brillinta  - C/w Lipitor  - Obtain repeat trop  - F/u TTE    #HTN  - C/w losartan and amlodipine     =========PULMONARY=========   #PIA  - No acute complaints   - Maintain SpO2 > 94%    =========INFECTIOUS DISEASE=========  #Leukocytosis  - Most likely iso STEMI  - Afebrile  - CTM WBC and temperature curve    =========GI=========  DASH diet    =========RENAL=========  #SHELLI  - SCr 1.61, unclear baseline  - Continue to monitor SCr, BUN  - Replete     =========Endocrine=========   #T2DM  - Hold home metformin  - ISS     - f/u TSH, lipid panel, A1c    =========Hematology=========   #PIA  - H&H stable  - CTM    =========DVT AND GI PROPHYLAXIS=========  - SubQ Heparin      Assessment & Plan  54yo male with PMH of HTN, DM that presented to OSH ED with chest pain after exercising in the treadmill. EKG showed signs of ST elevations in the anterior leads. Transferred to [facility] for urgent cath, now s/p 1x NADER to mid-LAD.    =========NEURO=========  #PIA  - AOx3   - CTM per protocol    =========CARDIOVASCULAR=========  #STEMI  - EKG in [place] showing ST segment elevations in leads  - Troponin 27  - S/p 1 NADER to mid-LAD  - Loaded with ASA and Brillinta  - C/w ASA and Brillinta  - C/w Lipitor  - Start Lopressor 12.5 BID  - Obtain repeat trop  - F/u TTE    #HTN  - Hold Losartan iso SHELLI  - Hold Amlodipine    =========PULMONARY=========   #PIA  - No acute complaints   - Maintain SpO2 > 94%    =========INFECTIOUS DISEASE=========  #Leukocytosis  - Most likely iso STEMI  - Afebrile  - CTM WBC and temperature curve    =========GI=========  DASH diet    =========RENAL=========  #SHELLI  - SCr 1.61, unclear baseline  - Continue to monitor SCr, BUN  - Replete     =========Endocrine=========   #T2DM  - Hold home metformin  - ISS     - f/u TSH, lipid panel, A1c    =========Hematology=========   #PIA  - H&H stable  - CTM    =========DVT AND GI PROPHYLAXIS=========  - SubQ Heparin

## 2023-11-11 NOTE — ED PROVIDER NOTE - PROGRESS NOTE DETAILS
Spoke with transfer center, spoke with cardiac fellow, EKG discussed concerning STEMI, will discuss with interventionalist

## 2023-11-11 NOTE — H&P ADULT - CRITICAL CARE SERVICES
Quality 111:Pneumonia Vaccination Status For Older Adults: Pneumococcal Vaccination Previously Received Quality 431: Preventive Care And Screening: Unhealthy Alcohol Use - Screening: Patient not identified as an unhealthy alcohol user when screened for unhealthy alcohol use using a systematic screening method Quality 110: Preventive Care And Screening: Influenza Immunization: Influenza Immunization Administered during Influenza season Detail Level: Detailed Additional Notes: Covid vaccination Quality 226: Preventive Care And Screening: Tobacco Use: Screening And Cessation Intervention: Patient screened for tobacco use and is an ex/non-smoker 74

## 2023-11-11 NOTE — H&P ADULT - NSHPREVIEWOFSYSTEMS_GEN_ALL_CORE
REVIEW OF SYSTEMS:    CONSTITUTIONAL: No fever  RESPIRATORY:  No shortness of breath  CARDIOVASCULAR: No chest pain or palpitations  GASTROINTESTINAL: No abdominal or epigastric pain. No nausea, vomiting, No diarrhea or constipation.   GENITOURINARY: No dysuria,   NEUROLOGICAL: No numbness or weakness

## 2023-11-11 NOTE — H&P ADULT - HISTORY OF PRESENT ILLNESS
54yo male with PMH of HTN, DM presenting with chest pressure and dyspnea that developed at ~0930, 10 minutes into walking on the treadmill at the gym. Patient stopped exercising and went home, however developed worsening dyspnea, chest discomfort, diaphoresis and pre-syncope. Was driven to the ED by his wife where EKG showed anterior ST elevations. The interventionalist on call, Dr. Warren, was notified, cath lab team was activated and the CICU staff also notified. Pt was loaded with ASA, Brilinta and started on Heparin by [place] per report. Pt transferred directly to the cath lab.    52yo male with PMH of HTN, DM presenting with chest pressure and dyspnea that developed at ~0930, 10 minutes into walking on the treadmill at the gym. Patient stopped exercising and went home, however developed worsening dyspnea, chest discomfort, diaphoresis and pre-syncope. Was driven to the ED by his wife where EKG showed anterior ST elevations. The interventionalist on call, Dr. Warren, was notified, cath lab team was activated and the CICU staff also notified. Pt was loaded with ASA, Brilinta and started on Heparin by [place] per report. Pt transferred directly to the cath lab.     In the cath he got    54yo male with PMH of HTN, DM presenting with chest pressure and dyspnea that developed at ~0930, 10 minutes into walking on the treadmill at the gym. Patient stopped exercising and went home, however developed worsening dyspnea, chest discomfort, diaphoresis and pre-syncope. Was driven to the ED by his wife where EKG showed anterior ST elevations. The interventionalist on call, Dr. Warren, was notified, cath lab team was activated and the CICU staff also notified. Pt was loaded with ASA, Brilinta and started on Heparin by [place] per report. Pt transferred directly to the cath lab.     Cath found mid-LAD 99% s/p 1 NADER.    54yo male with PMH of HTN, DM presenting with chest pressure and dyspnea that developed at ~0930, 10 minutes into walking on the treadmill at the gym. Patient stopped exercising and went home, however developed worsening dyspnea, chest discomfort, diaphoresis and pre-syncope. Was driven to the ED by his wife where EKG showed anterior ST elevations. The interventionalist on call, Dr. Warren, was notified, cath lab team was activated and the CICU staff also notified. Pt was loaded with ASA, Brilinta and started on Heparin by [place] per report. Pt transferred directly to the cath lab, found to have 99% stenosis of pLAD, now  s/p 1x NADER.

## 2023-11-11 NOTE — H&P ADULT - NSHPPHYSICALEXAM_GEN_ALL_CORE
HEAD:  Atraumatic, normocephalic  EYES: EOMI, PERRLA, conjunctiva and sclera clear  NECK: Supple, trachea midline, no JVD  HEART: Regular rate and rhythm, no murmurs, rubs, or gallops  LUNGS: Unlabored respirations.  Clear to auscultation bilaterally, no crackles, wheezing, or rhonchi  ABDOMEN: Soft, nontender, nondistended,   EXTREMITIES: 2+ peripheral pulses bilaterally. No clubbing, cyanosis, or edema  NERVOUS SYSTEM:  A&Ox3, moving all extremities, no focal deficits

## 2023-11-11 NOTE — H&P ADULT - ATTENDING COMMENTS
Admitted with anterior wall STEMI s/p PCI  DAPT with ASA, Ticagrelor   Lipitor 80  Hemodynamics acceptable, chest pain free - start beta blocker  Check TTE  Trend cardiac enzymes until peak  O2 sats mid to high 90s on nasal cannula - wean to room air  Cr elevated, unclear baseline, LVEDP elevated during cath - target 500 cc negative  H/H acceptable  Afebrile, no antibiotics   Sugars controlled, check Hgb A1c   No central access

## 2023-11-11 NOTE — PATIENT PROFILE ADULT - NSPROPTRIGHTREPNAME_GEN_A__NUR
jasmin Bexarotene Counseling:  I discussed with the patient the risks of bexarotene including but not limited to hair loss, dry lips/skin/eyes, liver abnormalities, hyperlipidemia, pancreatitis, depression/suicidal ideation, photosensitivity, drug rash/allergic reactions, hypothyroidism, anemia, leukopenia, infection, cataracts, and teratogenicity.  Patient understands that they will need regular blood tests to check lipid profile, liver function tests, white blood cell count, thyroid function tests and pregnancy test if applicable.

## 2023-11-11 NOTE — ED PROVIDER NOTE - CLINICAL SUMMARY MEDICAL DECISION MAKING FREE TEXT BOX
53-year-old male complaining of shortness of breath and chest pain, EKG concerning for STEMI, prolonged cardiology for cardiac cath, placed on cardiac monitor, send CBC, CMP, cardiac enzymes, give aspirin, Brilinta, heparin

## 2023-11-12 ENCOUNTER — TRANSCRIPTION ENCOUNTER (OUTPATIENT)
Age: 53
End: 2023-11-12

## 2023-11-12 VITALS — DIASTOLIC BLOOD PRESSURE: 84 MMHG | SYSTOLIC BLOOD PRESSURE: 124 MMHG | HEART RATE: 68 BPM

## 2023-11-12 PROBLEM — I10 ESSENTIAL (PRIMARY) HYPERTENSION: Chronic | Status: ACTIVE | Noted: 2023-11-11

## 2023-11-12 PROBLEM — E11.9 TYPE 2 DIABETES MELLITUS WITHOUT COMPLICATIONS: Chronic | Status: ACTIVE | Noted: 2023-11-11

## 2023-11-12 LAB
ALBUMIN SERPL ELPH-MCNC: 4.1 G/DL — SIGNIFICANT CHANGE UP (ref 3.3–5)
ALBUMIN SERPL ELPH-MCNC: 4.1 G/DL — SIGNIFICANT CHANGE UP (ref 3.3–5)
ALP SERPL-CCNC: 77 U/L — SIGNIFICANT CHANGE UP (ref 40–120)
ALP SERPL-CCNC: 77 U/L — SIGNIFICANT CHANGE UP (ref 40–120)
ALT FLD-CCNC: 50 U/L — HIGH (ref 10–45)
ALT FLD-CCNC: 50 U/L — HIGH (ref 10–45)
ANION GAP SERPL CALC-SCNC: 12 MMOL/L — SIGNIFICANT CHANGE UP (ref 5–17)
ANION GAP SERPL CALC-SCNC: 12 MMOL/L — SIGNIFICANT CHANGE UP (ref 5–17)
APTT BLD: 29.5 SEC — SIGNIFICANT CHANGE UP (ref 24.5–35.6)
APTT BLD: 29.5 SEC — SIGNIFICANT CHANGE UP (ref 24.5–35.6)
AST SERPL-CCNC: 69 U/L — HIGH (ref 10–40)
AST SERPL-CCNC: 69 U/L — HIGH (ref 10–40)
BILIRUB SERPL-MCNC: 0.8 MG/DL — SIGNIFICANT CHANGE UP (ref 0.2–1.2)
BILIRUB SERPL-MCNC: 0.8 MG/DL — SIGNIFICANT CHANGE UP (ref 0.2–1.2)
BUN SERPL-MCNC: 18 MG/DL — SIGNIFICANT CHANGE UP (ref 7–23)
BUN SERPL-MCNC: 18 MG/DL — SIGNIFICANT CHANGE UP (ref 7–23)
CALCIUM SERPL-MCNC: 9.3 MG/DL — SIGNIFICANT CHANGE UP (ref 8.4–10.5)
CALCIUM SERPL-MCNC: 9.3 MG/DL — SIGNIFICANT CHANGE UP (ref 8.4–10.5)
CHLORIDE SERPL-SCNC: 103 MMOL/L — SIGNIFICANT CHANGE UP (ref 96–108)
CHLORIDE SERPL-SCNC: 103 MMOL/L — SIGNIFICANT CHANGE UP (ref 96–108)
CK MB BLD-MCNC: 4.8 % — HIGH (ref 0–3.5)
CK MB BLD-MCNC: 4.8 % — HIGH (ref 0–3.5)
CK MB BLD-MCNC: 6.8 % — HIGH (ref 0–3.5)
CK MB BLD-MCNC: 6.8 % — HIGH (ref 0–3.5)
CK MB CFR SERPL CALC: 17.7 NG/ML — HIGH (ref 0–6.7)
CK MB CFR SERPL CALC: 17.7 NG/ML — HIGH (ref 0–6.7)
CK MB CFR SERPL CALC: 31.8 NG/ML — HIGH (ref 0–6.7)
CK MB CFR SERPL CALC: 31.8 NG/ML — HIGH (ref 0–6.7)
CK SERPL-CCNC: 369 U/L — HIGH (ref 30–200)
CK SERPL-CCNC: 369 U/L — HIGH (ref 30–200)
CK SERPL-CCNC: 469 U/L — HIGH (ref 30–200)
CK SERPL-CCNC: 469 U/L — HIGH (ref 30–200)
CO2 SERPL-SCNC: 22 MMOL/L — SIGNIFICANT CHANGE UP (ref 22–31)
CO2 SERPL-SCNC: 22 MMOL/L — SIGNIFICANT CHANGE UP (ref 22–31)
CREAT SERPL-MCNC: 1.33 MG/DL — HIGH (ref 0.5–1.3)
CREAT SERPL-MCNC: 1.33 MG/DL — HIGH (ref 0.5–1.3)
EGFR: 64 ML/MIN/1.73M2 — SIGNIFICANT CHANGE UP
EGFR: 64 ML/MIN/1.73M2 — SIGNIFICANT CHANGE UP
GLUCOSE BLDC GLUCOMTR-MCNC: 141 MG/DL — HIGH (ref 70–99)
GLUCOSE BLDC GLUCOMTR-MCNC: 141 MG/DL — HIGH (ref 70–99)
GLUCOSE BLDC GLUCOMTR-MCNC: 148 MG/DL — HIGH (ref 70–99)
GLUCOSE BLDC GLUCOMTR-MCNC: 148 MG/DL — HIGH (ref 70–99)
GLUCOSE BLDC GLUCOMTR-MCNC: 240 MG/DL — HIGH (ref 70–99)
GLUCOSE BLDC GLUCOMTR-MCNC: 240 MG/DL — HIGH (ref 70–99)
GLUCOSE SERPL-MCNC: 158 MG/DL — HIGH (ref 70–99)
GLUCOSE SERPL-MCNC: 158 MG/DL — HIGH (ref 70–99)
HCT VFR BLD CALC: 44.4 % — SIGNIFICANT CHANGE UP (ref 39–50)
HCT VFR BLD CALC: 44.4 % — SIGNIFICANT CHANGE UP (ref 39–50)
HGB BLD-MCNC: 15.2 G/DL — SIGNIFICANT CHANGE UP (ref 13–17)
HGB BLD-MCNC: 15.2 G/DL — SIGNIFICANT CHANGE UP (ref 13–17)
INR BLD: 0.98 RATIO — SIGNIFICANT CHANGE UP (ref 0.85–1.18)
INR BLD: 0.98 RATIO — SIGNIFICANT CHANGE UP (ref 0.85–1.18)
MAGNESIUM SERPL-MCNC: 1.9 MG/DL — SIGNIFICANT CHANGE UP (ref 1.6–2.6)
MAGNESIUM SERPL-MCNC: 1.9 MG/DL — SIGNIFICANT CHANGE UP (ref 1.6–2.6)
MCHC RBC-ENTMCNC: 31 PG — SIGNIFICANT CHANGE UP (ref 27–34)
MCHC RBC-ENTMCNC: 31 PG — SIGNIFICANT CHANGE UP (ref 27–34)
MCHC RBC-ENTMCNC: 34.2 GM/DL — SIGNIFICANT CHANGE UP (ref 32–36)
MCHC RBC-ENTMCNC: 34.2 GM/DL — SIGNIFICANT CHANGE UP (ref 32–36)
MCV RBC AUTO: 90.6 FL — SIGNIFICANT CHANGE UP (ref 80–100)
MCV RBC AUTO: 90.6 FL — SIGNIFICANT CHANGE UP (ref 80–100)
NRBC # BLD: 0 /100 WBCS — SIGNIFICANT CHANGE UP (ref 0–0)
NRBC # BLD: 0 /100 WBCS — SIGNIFICANT CHANGE UP (ref 0–0)
PHOSPHATE SERPL-MCNC: 2.9 MG/DL — SIGNIFICANT CHANGE UP (ref 2.5–4.5)
PHOSPHATE SERPL-MCNC: 2.9 MG/DL — SIGNIFICANT CHANGE UP (ref 2.5–4.5)
PLATELET # BLD AUTO: 197 K/UL — SIGNIFICANT CHANGE UP (ref 150–400)
PLATELET # BLD AUTO: 197 K/UL — SIGNIFICANT CHANGE UP (ref 150–400)
POTASSIUM SERPL-MCNC: 4.3 MMOL/L — SIGNIFICANT CHANGE UP (ref 3.5–5.3)
POTASSIUM SERPL-MCNC: 4.3 MMOL/L — SIGNIFICANT CHANGE UP (ref 3.5–5.3)
POTASSIUM SERPL-SCNC: 4.3 MMOL/L — SIGNIFICANT CHANGE UP (ref 3.5–5.3)
POTASSIUM SERPL-SCNC: 4.3 MMOL/L — SIGNIFICANT CHANGE UP (ref 3.5–5.3)
PROT SERPL-MCNC: 6.9 G/DL — SIGNIFICANT CHANGE UP (ref 6–8.3)
PROT SERPL-MCNC: 6.9 G/DL — SIGNIFICANT CHANGE UP (ref 6–8.3)
PROTHROM AB SERPL-ACNC: 10.8 SEC — SIGNIFICANT CHANGE UP (ref 9.5–13)
PROTHROM AB SERPL-ACNC: 10.8 SEC — SIGNIFICANT CHANGE UP (ref 9.5–13)
RBC # BLD: 4.9 M/UL — SIGNIFICANT CHANGE UP (ref 4.2–5.8)
RBC # BLD: 4.9 M/UL — SIGNIFICANT CHANGE UP (ref 4.2–5.8)
RBC # FLD: 12.1 % — SIGNIFICANT CHANGE UP (ref 10.3–14.5)
RBC # FLD: 12.1 % — SIGNIFICANT CHANGE UP (ref 10.3–14.5)
SODIUM SERPL-SCNC: 137 MMOL/L — SIGNIFICANT CHANGE UP (ref 135–145)
SODIUM SERPL-SCNC: 137 MMOL/L — SIGNIFICANT CHANGE UP (ref 135–145)
TROPONIN T, HIGH SENSITIVITY RESULT: 1458 NG/L — HIGH (ref 0–51)
TROPONIN T, HIGH SENSITIVITY RESULT: 1458 NG/L — HIGH (ref 0–51)
TROPONIN T, HIGH SENSITIVITY RESULT: 867 NG/L — HIGH (ref 0–51)
TROPONIN T, HIGH SENSITIVITY RESULT: 867 NG/L — HIGH (ref 0–51)
TSH SERPL-MCNC: 1.21 UIU/ML — SIGNIFICANT CHANGE UP (ref 0.27–4.2)
TSH SERPL-MCNC: 1.21 UIU/ML — SIGNIFICANT CHANGE UP (ref 0.27–4.2)
WBC # BLD: 11.73 K/UL — HIGH (ref 3.8–10.5)
WBC # BLD: 11.73 K/UL — HIGH (ref 3.8–10.5)
WBC # FLD AUTO: 11.73 K/UL — HIGH (ref 3.8–10.5)
WBC # FLD AUTO: 11.73 K/UL — HIGH (ref 3.8–10.5)

## 2023-11-12 PROCEDURE — C1757: CPT

## 2023-11-12 PROCEDURE — 93010 ELECTROCARDIOGRAM REPORT: CPT

## 2023-11-12 PROCEDURE — 93306 TTE W/DOPPLER COMPLETE: CPT | Mod: 26

## 2023-11-12 PROCEDURE — 86901 BLOOD TYPING SEROLOGIC RH(D): CPT

## 2023-11-12 PROCEDURE — C1887: CPT

## 2023-11-12 PROCEDURE — C1760: CPT

## 2023-11-12 PROCEDURE — 93458 L HRT ARTERY/VENTRICLE ANGIO: CPT | Mod: 59

## 2023-11-12 PROCEDURE — 82550 ASSAY OF CK (CPK): CPT

## 2023-11-12 PROCEDURE — 80053 COMPREHEN METABOLIC PANEL: CPT

## 2023-11-12 PROCEDURE — 80061 LIPID PANEL: CPT

## 2023-11-12 PROCEDURE — 83036 HEMOGLOBIN GLYCOSYLATED A1C: CPT

## 2023-11-12 PROCEDURE — C1874: CPT

## 2023-11-12 PROCEDURE — 84443 ASSAY THYROID STIM HORMONE: CPT

## 2023-11-12 PROCEDURE — 93005 ELECTROCARDIOGRAM TRACING: CPT

## 2023-11-12 PROCEDURE — 84484 ASSAY OF TROPONIN QUANT: CPT

## 2023-11-12 PROCEDURE — C9606: CPT | Mod: LD

## 2023-11-12 PROCEDURE — C1753: CPT

## 2023-11-12 PROCEDURE — 86850 RBC ANTIBODY SCREEN: CPT

## 2023-11-12 PROCEDURE — 92978 ENDOLUMINL IVUS OCT C 1ST: CPT | Mod: LD

## 2023-11-12 PROCEDURE — C1725: CPT

## 2023-11-12 PROCEDURE — 83735 ASSAY OF MAGNESIUM: CPT

## 2023-11-12 PROCEDURE — 85027 COMPLETE CBC AUTOMATED: CPT

## 2023-11-12 PROCEDURE — C8929: CPT

## 2023-11-12 PROCEDURE — 85520 HEPARIN ASSAY: CPT

## 2023-11-12 PROCEDURE — 86900 BLOOD TYPING SEROLOGIC ABO: CPT

## 2023-11-12 PROCEDURE — C1769: CPT

## 2023-11-12 PROCEDURE — 84100 ASSAY OF PHOSPHORUS: CPT

## 2023-11-12 PROCEDURE — 99291 CRITICAL CARE FIRST HOUR: CPT | Mod: 25

## 2023-11-12 PROCEDURE — C1894: CPT

## 2023-11-12 PROCEDURE — 82553 CREATINE MB FRACTION: CPT

## 2023-11-12 PROCEDURE — 85730 THROMBOPLASTIN TIME PARTIAL: CPT

## 2023-11-12 PROCEDURE — 82962 GLUCOSE BLOOD TEST: CPT

## 2023-11-12 PROCEDURE — 36415 COLL VENOUS BLD VENIPUNCTURE: CPT

## 2023-11-12 PROCEDURE — 85610 PROTHROMBIN TIME: CPT

## 2023-11-12 RX ORDER — ASPIRIN/CALCIUM CARB/MAGNESIUM 324 MG
1 TABLET ORAL
Qty: 90 | Refills: 3
Start: 2023-11-12

## 2023-11-12 RX ORDER — ATORVASTATIN CALCIUM 80 MG/1
1 TABLET, FILM COATED ORAL
Qty: 30 | Refills: 3
Start: 2023-11-12

## 2023-11-12 RX ORDER — METOPROLOL TARTRATE 50 MG
1 TABLET ORAL
Qty: 30 | Refills: 3
Start: 2023-11-12

## 2023-11-12 RX ORDER — LOSARTAN POTASSIUM 100 MG/1
1 TABLET, FILM COATED ORAL
Refills: 0 | DISCHARGE

## 2023-11-12 RX ORDER — MAGNESIUM SULFATE 500 MG/ML
2 VIAL (ML) INJECTION ONCE
Refills: 0 | Status: COMPLETED | OUTPATIENT
Start: 2023-11-12 | End: 2023-11-12

## 2023-11-12 RX ORDER — TICAGRELOR 90 MG/1
1 TABLET ORAL
Qty: 180 | Refills: 3
Start: 2023-11-12 | End: 2024-11-05

## 2023-11-12 RX ORDER — TICAGRELOR 90 MG/1
1 TABLET ORAL
Qty: 90 | Refills: 3
Start: 2023-11-12

## 2023-11-12 RX ORDER — METOPROLOL TARTRATE 50 MG
25 TABLET ORAL
Refills: 0 | Status: DISCONTINUED | OUTPATIENT
Start: 2023-11-12 | End: 2023-11-12

## 2023-11-12 RX ADMIN — Medication 81 MILLIGRAM(S): at 12:57

## 2023-11-12 RX ADMIN — HEPARIN SODIUM 5000 UNIT(S): 5000 INJECTION INTRAVENOUS; SUBCUTANEOUS at 05:42

## 2023-11-12 RX ADMIN — Medication 2: at 09:28

## 2023-11-12 RX ADMIN — TICAGRELOR 90 MILLIGRAM(S): 90 TABLET ORAL at 05:42

## 2023-11-12 RX ADMIN — Medication 25 MILLIGRAM(S): at 05:42

## 2023-11-12 RX ADMIN — Medication 25 GRAM(S): at 03:56

## 2023-11-12 RX ADMIN — LORATADINE 10 MILLIGRAM(S): 10 TABLET ORAL at 12:58

## 2023-11-12 NOTE — DISCHARGE NOTE NURSING/CASE MANAGEMENT/SOCIAL WORK - PATIENT PORTAL LINK FT
You can access the FollowMyHealth Patient Portal offered by [facility] by registering at the following website: [email or web address]. By joining [facility]’s FollowMyHealth portal, you will also be able to view your health information using other applications (apps) compatible with our system.

## 2023-11-12 NOTE — DISCHARGE NOTE PROVIDER - NSDCMRMEDTOKEN_GEN_ALL_CORE_FT
aspirin 81 mg oral delayed release tablet: 1 tab(s) orally once a day  atorvastatin 80 mg oral tablet: 1 tab(s) orally once a day (at bedtime)  metFORMIN 1000 mg oral tablet: 1 tab(s) orally once a day  ticagrelor 90 mg oral tablet: 1 tab(s) orally every 12 hours  Toprol-XL 50 mg oral tablet, extended release: 1 tab(s) orally

## 2023-11-12 NOTE — DISCHARGE NOTE NURSING/CASE MANAGEMENT/SOCIAL WORK - NSDCFUADDAPPT_GEN_ALL_CORE_FT
Please make an appointment with your primary care doctor in 1 week for your DM control and cardiologist in 1 week

## 2023-11-12 NOTE — PROGRESS NOTE ADULT - SUBJECTIVE AND OBJECTIVE BOX
CICU DAY NOTE  Admission date: 11/11/23  Chief complaint/ Diagnosis: STEMI   HPI: 54yo male with PMH of HTN, DM presenting with chest pressure and dyspnea that developed at ~0930, 10 minutes into walking on the treadmill at the gym. Patient stopped exercising and went home, however developed worsening dyspnea, chest discomfort, diaphoresis and pre-syncope. Was driven to the ED by his wife where EKG showed anterior ST elevations. The interventionalist on call, Dr. Warren, was notified, cath lab team was activated and the CICU staff also notified. Pt was loaded with ASA, Brilinta and started on Heparin by [place] per report. Pt transferred directly to the cath lab, found to have 99% stenosis of pLAD, now  s/p 1x NADER.     Interval history: Uneventful overnight  Pt remains hemodynamically stable and CP free    REVIEW OF SYSTEMS  Denies CP, Palpitation, SOB, Dyspnea[ x ] All other systems negative    MEDICATIONS  (STANDING)  aspirin enteric coated 81 milliGRAM(s) Oral daily  atorvastatin 80 milliGRAM(s) Oral at bedtime  heparin   Injectable 5000 Unit(s) SubCutaneous every 8 hours  insulin lispro (ADMELOG) corrective regimen sliding scale   SubCutaneous three times a day before meals  loratadine 10 milliGRAM(s) Oral daily  metoprolol tartrate 25 milliGRAM(s) Oral two times a day  ticagrelor 90 milliGRAM(s) Oral every 12 hours    PAST MEDICAL & SURGICAL HISTORY:  Type 2 diabetes mellitus  Hypertension  No significant past surgical history    Allergy   No Known Allergies    ICU Vital Signs Last 24 Hrs  T(C): 36.8 (Max: 37.5)  HR: 82  (75 - 104)  BP: 124/84 (115/82 - 146/78)  BP(mean): 98 (88 - 106)  RR: 28 (10 - 48)  SpO2: 95%  (94% - 100%) on room air   I&O's Summary  IN: 650 mL / OUT: 2400 mL / NET: -1750 mL    PHYSICAL EXAM  Appearance: Normal, NAD  HEAD:   Normocephalic  EYES:  PERRLA, conjunctiva and sclera clear  NECK: Supple, No JVD  CHEST/LUNG: Clear to auscultation bilaterally; No wheezing  HEART: Normal S1, S2. No murmurs, rubs, or gallops  ABDOMEN: + Bowel sounds, Soft, NT, ND   EXTREMITIES:  2+ Peripheral Pulses, No clubbing, cyanosis, or edema  NEUROLOGY: non-focal, aaox3  Lymphatic: No lymphadenopathy  SKIN: No rashes or lesions    Interpretation of Telemetry:                        15.2   11.73 )-----------( 197                  44.4       137  |  103  |  18  ----------------------<  158<H>  4.3   |  22  |  1.33<H>    Ca    9.3      Phos  2.9     Mg     1.9 (repleteD)   TPro  6.9  /  Alb  4.1  /  TBili  0.8  /  DBili  x   /  AST  69<H>  /  ALT  50<H>  /  AlkPhos  77  11-12    CARDIAC MARKERS ( 12 Nov 2023 01:08 )  x     / x     / 469 U/L / x     / 31.8 ng/mL  CARDIAC MARKERS ( 11 Nov 2023 17:02 )  x     / x     / 325 U/L / x     / 29.4 ng/mL  CARDIAC MARKERS ( 11 Nov 2023 10:20 )  x     / x     / 118 U/L / x     / 0.8 ng/mL  F/S 115-199         CICU DAY NOTE  Admission date: 11/11/23  Chief complaint/ Diagnosis: STEMI   HPI: 52yo male with PMH of HTN, DM presenting with chest pressure and dyspnea that developed at ~0930, 10 minutes into walking on the treadmill at the gym. Patient stopped exercising and went home, however developed worsening dyspnea, chest discomfort, diaphoresis and pre-syncope. Was driven to the ED by his wife where EKG showed anterior ST elevations. The interventionalist on call, Dr. Warren, was notified, cath lab team was activated and the CICU staff also notified. Pt was loaded with ASA, Brilinta and started on Heparin by [place] per report. Pt transferred directly to the cath lab, found to have 99% stenosis of pLAD, now  s/p 1x NADER.     Interval history: Uneventful overnight  Pt remains hemodynamically stable and CP free    REVIEW OF SYSTEMS  Denies CP, Palpitation, SOB, Dyspnea[ x ] All other systems negative    MEDICATIONS  (STANDING)  aspirin enteric coated 81 milliGRAM(s) Oral daily  atorvastatin 80 milliGRAM(s) Oral at bedtime  heparin   Injectable 5000 Unit(s) SubCutaneous every 8 hours  insulin lispro (ADMELOG) corrective regimen sliding scale   SubCutaneous three times a day before meals  loratadine 10 milliGRAM(s) Oral daily  metoprolol tartrate 25 milliGRAM(s) Oral two times a day  ticagrelor 90 milliGRAM(s) Oral every 12 hours    PAST MEDICAL & SURGICAL HISTORY:  Type 2 diabetes mellitus  Hypertension  No significant past surgical history    Allergy   No Known Allergies    ICU Vital Signs Last 24 Hrs  T(C): 36.8 (Max: 37.5)  HR: 82  (75 - 104)  BP: 124/84 (115/82 - 146/78)  BP(mean): 98 (88 - 106)  RR: 28 (10 - 48)  SpO2: 95%  (94% - 100%) on room air   I&O's Summary  IN: 650 mL / OUT: 2400 mL / NET: -1750 mL    PHYSICAL EXAM  Appearance: Normal, NAD  HEAD:   Normocephalic  EYES:  PERRLA, conjunctiva and sclera clear  NECK: Supple, No JVD  CHEST/LUNG: Clear to auscultation bilaterally; No wheezing  HEART: Normal S1, S2. No murmurs, rubs, or gallops  ABDOMEN: + Bowel sounds, Soft, NT, ND   EXTREMITIES:  2+ Peripheral Pulses, No clubbing, cyanosis, or edema  NEUROLOGY: non-focal, aaox3  Lymphatic: No lymphadenopathy  SKIN: No rashes or lesions    Interpretation of Telemetry:                        15.2   11.73 )-----------( 197                  44.4       137  |  103  |  18  ----------------------<  158<H>  4.3   |  22  |  1.33<H>    Ca    9.3      Phos  2.9     Mg     1.9 (repleteD)   TPro  6.9  /  Alb  4.1  /  TBili  0.8  /  DBili  x   /  AST  69<H>  /  ALT  50<H>  /  AlkPhos  77  11-12    CARDIAC MARKERS ( 12 Nov 2023 01:08 )  x     / x     / 469 U/L / x     / 31.8 ng/mL  CARDIAC MARKERS ( 11 Nov 2023 17:02 )  x     / x     / 325 U/L / x     / 29.4 ng/mL  CARDIAC MARKERS ( 11 Nov 2023 10:20 )  x     / x     / 118 U/L / x     / 0.8 ng/mL  F/S 115-199         CICU DAY NOTE  Admission date: 11/11/23  Chief complaint/ Diagnosis: STEMI   HPI: 54yo male with PMH of HTN, DM presenting with chest pressure and dyspnea that developed at ~0930, 10 minutes into walking on the treadmill at the gym. Patient stopped exercising and went home, however developed worsening dyspnea, chest discomfort, diaphoresis and pre-syncope. Was driven to the ED by his wife where EKG showed anterior ST elevations. The interventionalist on call, Dr. Warren, was notified, cath lab team was activated and the CICU staff also notified. Pt was loaded with ASA, Brilinta and started on Heparin by [place] per report. Pt transferred directly to the cath lab, found to have 99% stenosis of pLAD, now  s/p 1x NADER.     Interval history: Uneventful overnight  Pt remains hemodynamically stable and CP free    REVIEW OF SYSTEMS  Denies CP, Palpitation, SOB, Dyspnea[ x ] All other systems negative    MEDICATIONS  (STANDING)  aspirin enteric coated 81 milliGRAM(s) Oral daily  atorvastatin 80 milliGRAM(s) Oral at bedtime  heparin   Injectable 5000 Unit(s) SubCutaneous every 8 hours  insulin lispro (ADMELOG) corrective regimen sliding scale   SubCutaneous three times a day before meals  loratadine 10 milliGRAM(s) Oral daily  metoprolol tartrate 25 milliGRAM(s) Oral two times a day  ticagrelor 90 milliGRAM(s) Oral every 12 hours    PAST MEDICAL & SURGICAL HISTORY:  Type 2 diabetes mellitus  Hypertension  No significant past surgical history    Allergy   No Known Allergies    ICU Vital Signs Last 24 Hrs  T(C): 36.8 (Max: 37.5)  HR: 82  (75 - 104)  BP: 124/84 (115/82 - 146/78)  BP(mean): 98 (88 - 106)  RR: 28 (10 - 48)  SpO2: 95%  (94% - 100%) on room air   I&O's Summary  IN: 650 mL / OUT: 2400 mL / NET: -1750 mL    PHYSICAL EXAM  Appearance: Normal, NAD  HEAD:   Normocephalic  EYES:  PERRLA, conjunctiva and sclera clear  NECK: Supple, No JVD  CHEST/LUNG: Clear to auscultation bilaterally; No wheezing  HEART: Normal S1, S2. No murmurs, rubs, or gallops  ABDOMEN: + Bowel sounds, Soft, NT, ND   EXTREMITIES:  2+ Peripheral Pulses, No clubbing, cyanosis, or edema  NEUROLOGY: non-focal, aaox3  Lymphatic: No lymphadenopathy  SKIN: No rashes or lesions, right groin per close w/o bleeding or hematoma or no exduate     Interpretation of Telemetry:                        15.2   11.73 )-----------( 197                  44.4       137  |  103  |  18  ----------------------<  158<H>  4.3   |  22  |  1.33<H>    Ca    9.3      Phos  2.9     Mg     1.9 (repleteD)   TPro  6.9  /  Alb  4.1  /  TBili  0.8  /  DBili  x   /  AST  69<H>  /  ALT  50<H>  /  AlkPhos  77  11-12    CARDIAC MARKERS ( 12 Nov 2023 01:08 )  x     / x     / 469 U/L / x     / 31.8 ng/mL  CARDIAC MARKERS ( 11 Nov 2023 17:02 )  x     / x     / 325 U/L / x     / 29.4 ng/mL  CARDIAC MARKERS ( 11 Nov 2023 10:20 )  x     / x     / 118 U/L / x     / 0.8 ng/mL  F/S 115-199

## 2023-11-12 NOTE — DISCHARGE NOTE PROVIDER - NSDCCPCAREPLAN_GEN_ALL_CORE_FT
PRINCIPAL DISCHARGE DIAGNOSIS  Diagnosis: STEMI (ST elevation myocardial infarction)  Assessment and Plan of Treatment:       SECONDARY DISCHARGE DIAGNOSES  Diagnosis: Benign essential HTN  Assessment and Plan of Treatment:     Diagnosis: DM (diabetes mellitus)  Assessment and Plan of Treatment:

## 2023-11-12 NOTE — DISCHARGE NOTE NURSING/CASE MANAGEMENT/SOCIAL WORK - NSDCPEFALRISK_GEN_ALL_CORE
For information on Fall & Injury Prevention, visit: [email or web address] OR  [email or web address] OR  https://www.cdc.gov/steadi/patient.html

## 2023-11-12 NOTE — DISCHARGE NOTE PROVIDER - HOSPITAL COURSE
52yo male with PMH of HTN, DM presenting with chest pressure and dyspnea that developed at ~0930, 10 minutes into walking on the treadmill at the gym. Patient stopped exercising and went home, however developed worsening dyspnea, chest discomfort, diaphoresis and pre-syncope. Was driven to the ED by his wife where EKG showed anterior ST elevations. The interventionalist on call, Dr. Warren, was notified, cath lab team was activated and the CICU staff also notified. Pt was loaded with ASA, Brilinta and started on Heparin by [place] per report. Pt transferred directly to the cath lab, found to have 99% stenosis of pLAD, now  s/p 1x NADER to p LAD. Pt remains hemodynmaically stable and CP free. CE peaked. TTE shows preserved LV/RV. Pt ambulates w/o difficulties 52yo male with PMH of HTN, DM presenting with chest pressure and dyspnea that developed at ~0930, 10 minutes into walking on the treadmill at the gym. Patient stopped exercising and went home, however developed worsening dyspnea, chest discomfort, diaphoresis and pre-syncope. Was driven to the ED by his wife where EKG showed anterior ST elevations. The interventionalist on call, Dr. Warren, was notified, cath lab team was activated and the CICU staff also notified. Pt was loaded with ASA, Brilinta and started on Heparin by [place] per report. Pt transferred directly to the cath lab, found to have 99% stenosis of pLAD, now  s/p 1x NADER to p LAD. Pt remains hemodynmaically stable and CP free. CE peaked. TTE shows preserved LV/RV. Pt ambulates w/o difficulties

## 2023-11-12 NOTE — PROGRESS NOTE ADULT - NS ATTEND AMEND GEN_ALL_CORE FT
Admitted with anterior wall STEMI s/p PCI  DAPT with ASA, Ticagrelor   Lipitor 80  Hemodynamics acceptable, chest pain free - continue beta blocker  TTE with preserved LVEF  Trend cardiac enzymes until peak  O2 sats mid to high 90s on room air  Cr labile, unclear baseline, compensated on exam - target even  H/H acceptable  Afebrile, no antibiotics   Sugars controlled, hgb A1c elevated - continue outpatient Metformin on discharge   No central access  OOB/ambulate

## 2023-11-12 NOTE — PROGRESS NOTE ADULT - ASSESSMENT
54yo male with PMH of HTN, DM that presented to [facility] ED with chest pain after exercising in the treadmill. EKG showed signs of ST elevations in the anterior leads. Transferred to [facility] for urgent cath, now s/p 1x NADER to mid-LAD.    =========NEURO=========  #PIA  - AOx3   - CTM per protocol    =========CARDIOVASCULAR=========  #STEMI  - EKG in [place] showing ST segment elevations in leads  - Troponin 27  - S/p 1 NADER to mid-LAD  - Loaded with ASA and Brillinta  - C/w ASA and Brillinta  - C/w Lipitor  - Start Lopressor 12.5 BID  - Obtain repeat trop  - F/u TTE    #HTN  - Hold Losartan iso SHELLI  - Hold Amlodipine    =========PULMONARY=========   #PIA  - No acute complaints   - Maintain SpO2 > 94%    =========INFECTIOUS DISEASE=========  #Leukocytosis  - Most likely iso STEMI  - Afebrile  - CTM WBC and temperature curve    =========GI=========  DASH diet    =========RENAL=========  #SHELLI  - SCr 1.61, unclear baseline  - Continue to monitor SCr, BUN  - Replete     =========Endocrine=========   #T2DM  - Hold home metformin  - ISS     - f/u TSH, lipid panel, A1c    =========Hematology=========   #PIA  - H&H stable  - CTM    =========DVT AND GI PROPHYLAXIS=========  - SubQ Heparin      52yo male with PMH of HTN, DM that presented to [facility] ED with chest pain after exercising in the treadmill. EKG showed signs of ST elevations in the anterior leads. Transferred to [facility] for urgent cath, now s/p 1x NADER to mid-LAD.    =========NEURO=========  #PIA  - AOx3   - CTM per protocol    =========CARDIOVASCULAR=========  #STEMI  - EKG in [place] showing ST segment elevations in leads  - Troponin 27  - S/p 1 NADER to mid-LAD  - Loaded with ASA and Brillinta  - C/w ASA and Brillinta  - C/w Lipitor  - Start Lopressor 12.5 BID  - Obtain repeat trop  - F/u TTE    #HTN  - Hold Losartan iso SHELLI  - Hold Amlodipine    =========PULMONARY=========   #PIA  - No acute complaints   - Maintain SpO2 > 94%    =========INFECTIOUS DISEASE=========  #Leukocytosis  - Most likely iso STEMI  - Afebrile  - CTM WBC and temperature curve    =========GI=========  DASH diet    =========RENAL=========  #SHELLI  - SCr 1.61, unclear baseline  - Continue to monitor SCr, BUN  - Replete     =========Endocrine=========   #T2DM  - Hold home metformin  - ISS     - f/u TSH, lipid panel, A1c    =========Hematology=========   #PIA  - H&H stable  - CTM    =========DVT AND GI PROPHYLAXIS=========  - SubQ Heparin     Ambulate as tolerated    54yo male with PMH of HTN, DM that presented to [facility] ED with chest pain after exercising in the treadmill. EKG showed signs of ST elevations in the anterior leads. Transferred to [facility] for urgent cath, now s/p 1x NADER to mid-LAD.    =========NEURO=========  #PIA  - AOx3   - CTM per protocol    =========CARDIOVASCULAR=========  #STEMI  - EKG in [place] showing ST segment elevations in leads  - S/p 1 NADER to mid-LAD  - Loaded with ASA and Brillinta  - C/w ASA and Brillinta  - C/w Lipitor  - Cont. Metoprolol 25 bid   Echo (11/11) shows preserved LV   CE peaked   Change to Toprol XL upon discharge     #HTN  - Hold Losartan iso SHELLI  - Hold Amlodipine    =========PULMONARY=========   #PIA  - No acute complaints   - Maintain SpO2 > 94%    =========INFECTIOUS DISEASE=========  #Leukocytosis  - Most likely iso STEMI  - Afebrile  - CTM WBC and temperature curve    =========GI=========  DASH diet    =========RENAL=========  #SHELLI  - SCr 1.61, unclear baseline  - Continue to monitor SCr, BUN  - Replete     =========Endocrine=========   #T2DM  - Hold home metformin  - ISS     - f/u TSH, lipid panel, A1c    =========Hematology=========   #PIA  - H&H stable  - CTM    =========DVT AND GI PROPHYLAXIS=========  - SubQ Heparin     Ambulate as tolerated   Brilinta coverage will be checked prior to discharge  - Change metoprolol to toprol

## 2023-11-12 NOTE — DISCHARGE NOTE PROVIDER - NSDCFUSCHEDAPPT_GEN_ALL_CORE_FT
Kasia Butler  [facility]  INTMED [address]  Scheduled Appointment: 12/19/2023    Johnnie Kaplan  [facility]  CARDIOLOGY [phone]  Scheduled Appointment: 12/20/2023

## 2023-11-12 NOTE — CHART NOTE - NSCHARTNOTEFT_GEN_A_CORE
CICU Transfer Note    Transfer from: CICU    Transfer to:  ( X ) Telemetry         Accepting Physician: ______________    Signout given to: ______________    [facility] COURSE:   53M PMH of HTN, DM presented to OSH ED with chest pain after exercising on the treadmill. EKG showed signs of ST elevations in the anterior leads. Transferred to [facility] for urgent cath, now s/p 1x NADER to mid-LAD. Admitted to CICU s/p cath. Hemodynamically stable.      FOR FOLLOW UP:  [] trend CE  [] follow up repeat TTE for EF evaluation  [] GDMT as tolerated      Robina Christensen PA-C

## 2023-11-13 ENCOUNTER — APPOINTMENT (OUTPATIENT)
Dept: INTERNAL MEDICINE | Facility: CLINIC | Age: 53
End: 2023-11-13
Payer: COMMERCIAL

## 2023-11-13 ENCOUNTER — TRANSCRIPTION ENCOUNTER (OUTPATIENT)
Age: 53
End: 2023-11-13

## 2023-11-13 VITALS
DIASTOLIC BLOOD PRESSURE: 76 MMHG | TEMPERATURE: 97.3 F | RESPIRATION RATE: 15 BRPM | HEART RATE: 73 BPM | OXYGEN SATURATION: 97 % | SYSTOLIC BLOOD PRESSURE: 123 MMHG

## 2023-11-13 VITALS — SYSTOLIC BLOOD PRESSURE: 118 MMHG | DIASTOLIC BLOOD PRESSURE: 80 MMHG

## 2023-11-13 DIAGNOSIS — Z09 ENCOUNTER FOR FOLLOW-UP EXAMINATION AFTER COMPLETED TREATMENT FOR CONDITIONS OTHER THAN MALIGNANT NEOPLASM: ICD-10-CM

## 2023-11-13 LAB
CHOLEST SERPL-MCNC: 212 MG/DL — HIGH
CHOLEST SERPL-MCNC: 212 MG/DL — HIGH
HDLC SERPL-MCNC: 36 MG/DL — LOW
HDLC SERPL-MCNC: 36 MG/DL — LOW
LIPID PNL WITH DIRECT LDL SERPL: 97 MG/DL — SIGNIFICANT CHANGE UP
LIPID PNL WITH DIRECT LDL SERPL: 97 MG/DL — SIGNIFICANT CHANGE UP
NON HDL CHOLESTEROL: 176 MG/DL — HIGH
NON HDL CHOLESTEROL: 176 MG/DL — HIGH
TRIGL SERPL-MCNC: 470 MG/DL — HIGH
TRIGL SERPL-MCNC: 470 MG/DL — HIGH

## 2023-11-13 PROCEDURE — 99496 TRANSJ CARE MGMT HIGH F2F 7D: CPT | Mod: 25

## 2023-11-13 RX ORDER — FEXOFENADINE HYDROCHLORIDE 180 MG/1
180 TABLET, FILM COATED ORAL
Qty: 90 | Refills: 3 | Status: ACTIVE | COMMUNITY
Start: 2023-11-13

## 2023-11-13 RX ORDER — AMLODIPINE BESYLATE 10 MG/1
10 TABLET ORAL
Qty: 90 | Refills: 2 | Status: DISCONTINUED | COMMUNITY
Start: 2021-11-04 | End: 2023-11-13

## 2023-11-13 RX ORDER — LOSARTAN POTASSIUM 100 MG/1
100 TABLET, FILM COATED ORAL
Qty: 90 | Refills: 2 | Status: DISCONTINUED | COMMUNITY
Start: 2021-12-01 | End: 2023-11-13

## 2023-11-15 ENCOUNTER — NON-APPOINTMENT (OUTPATIENT)
Age: 53
End: 2023-11-15

## 2023-11-15 ENCOUNTER — APPOINTMENT (OUTPATIENT)
Dept: CARDIOLOGY | Facility: CLINIC | Age: 53
End: 2023-11-15
Payer: COMMERCIAL

## 2023-11-15 VITALS
OXYGEN SATURATION: 91 % | HEART RATE: 78 BPM | SYSTOLIC BLOOD PRESSURE: 115 MMHG | HEIGHT: 72 IN | WEIGHT: 225 LBS | TEMPERATURE: 98.6 F | DIASTOLIC BLOOD PRESSURE: 69 MMHG | BODY MASS INDEX: 30.48 KG/M2

## 2023-11-15 DIAGNOSIS — R74.8 ABNORMAL LEVELS OF OTHER SERUM ENZYMES: ICD-10-CM

## 2023-11-15 DIAGNOSIS — I25.2 OLD MYOCARDIAL INFARCTION: ICD-10-CM

## 2023-11-15 DIAGNOSIS — Z95.5 PRESENCE OF CORONARY ANGIOPLASTY IMPLANT AND GRAFT: ICD-10-CM

## 2023-11-15 DIAGNOSIS — R79.89 OTHER SPECIFIED ABNORMAL FINDINGS OF BLOOD CHEMISTRY: ICD-10-CM

## 2023-11-15 PROCEDURE — 93000 ELECTROCARDIOGRAM COMPLETE: CPT

## 2023-11-15 PROCEDURE — 99204 OFFICE O/P NEW MOD 45 MIN: CPT | Mod: 25

## 2023-11-16 PROBLEM — I25.2 HISTORY OF ST ELEVATION MYOCARDIAL INFARCTION (STEMI): Status: RESOLVED | Noted: 2023-11-13 | Resolved: 2023-11-16

## 2023-11-16 PROBLEM — Z95.5 S/P DRUG ELUTING CORONARY STENT PLACEMENT: Status: RESOLVED | Noted: 2023-11-16 | Resolved: 2023-11-16

## 2023-11-16 PROBLEM — R74.8 ELEVATED CPK: Status: RESOLVED | Noted: 2022-12-16 | Resolved: 2023-11-16

## 2023-11-16 PROBLEM — R79.89 AZOTEMIA: Status: RESOLVED | Noted: 2022-08-06 | Resolved: 2023-11-16

## 2023-12-16 LAB
25(OH)D3 SERPL-MCNC: 38.7 NG/ML
ALBUMIN SERPL ELPH-MCNC: 4.7 G/DL
ALP BLD-CCNC: 112 U/L
ALT SERPL-CCNC: 28 U/L
ANION GAP SERPL CALC-SCNC: 14 MMOL/L
APPEARANCE: CLEAR
AST SERPL-CCNC: 20 U/L
BASOPHILS # BLD AUTO: 0.06 K/UL
BASOPHILS NFR BLD AUTO: 0.5 %
BILIRUB SERPL-MCNC: 0.9 MG/DL
BILIRUBIN URINE: NEGATIVE
BLOOD URINE: NEGATIVE
BUN SERPL-MCNC: 20 MG/DL
CALCIUM SERPL-MCNC: 10.3 MG/DL
CHLORIDE SERPL-SCNC: 99 MMOL/L
CHOLEST SERPL-MCNC: 113 MG/DL
CK SERPL-CCNC: 135 U/L
CO2 SERPL-SCNC: 27 MMOL/L
COLOR: YELLOW
CREAT SERPL-MCNC: 1.4 MG/DL
EGFR: 60 ML/MIN/1.73M2
EOSINOPHIL # BLD AUTO: 0.29 K/UL
EOSINOPHIL NFR BLD AUTO: 2.5 %
ESTIMATED AVERAGE GLUCOSE: 154 MG/DL
GLUCOSE QUALITATIVE U: NEGATIVE MG/DL
GLUCOSE SERPL-MCNC: 155 MG/DL
HBA1C MFR BLD HPLC: 7 %
HCT VFR BLD CALC: 46.7 %
HDLC SERPL-MCNC: 41 MG/DL
HGB BLD-MCNC: 15.6 G/DL
IMM GRANULOCYTES NFR BLD AUTO: 0.3 %
KETONES URINE: NEGATIVE MG/DL
LDLC SERPL CALC-MCNC: 48 MG/DL
LEUKOCYTE ESTERASE URINE: NEGATIVE
LYMPHOCYTES # BLD AUTO: 2.2 K/UL
LYMPHOCYTES NFR BLD AUTO: 18.6 %
MAN DIFF?: NORMAL
MCHC RBC-ENTMCNC: 31.6 PG
MCHC RBC-ENTMCNC: 33.4 GM/DL
MCV RBC AUTO: 94.7 FL
MONOCYTES # BLD AUTO: 1.04 K/UL
MONOCYTES NFR BLD AUTO: 8.8 %
NEUTROPHILS # BLD AUTO: 8.2 K/UL
NEUTROPHILS NFR BLD AUTO: 69.3 %
NITRITE URINE: NEGATIVE
NONHDLC SERPL-MCNC: 72 MG/DL
PH URINE: 7
PLATELET # BLD AUTO: 224 K/UL
POTASSIUM SERPL-SCNC: 5.2 MMOL/L
PROT SERPL-MCNC: 7.7 G/DL
PROTEIN URINE: NORMAL MG/DL
PSA SERPL-MCNC: 0.35 NG/ML
RBC # BLD: 4.93 M/UL
RBC # FLD: 12.8 %
SODIUM SERPL-SCNC: 140 MMOL/L
SPECIFIC GRAVITY URINE: 1.02
TRIGL SERPL-MCNC: 144 MG/DL
UROBILINOGEN URINE: 1 MG/DL
WBC # FLD AUTO: 11.83 K/UL

## 2023-12-19 ENCOUNTER — APPOINTMENT (OUTPATIENT)
Dept: INTERNAL MEDICINE | Facility: CLINIC | Age: 53
End: 2023-12-19
Payer: COMMERCIAL

## 2023-12-19 VITALS
BODY MASS INDEX: 29.8 KG/M2 | OXYGEN SATURATION: 95 % | SYSTOLIC BLOOD PRESSURE: 154 MMHG | HEART RATE: 88 BPM | TEMPERATURE: 97.9 F | WEIGHT: 220 LBS | DIASTOLIC BLOOD PRESSURE: 88 MMHG | HEIGHT: 72 IN

## 2023-12-19 VITALS — SYSTOLIC BLOOD PRESSURE: 140 MMHG | DIASTOLIC BLOOD PRESSURE: 80 MMHG

## 2023-12-19 DIAGNOSIS — Z00.00 ENCOUNTER FOR GENERAL ADULT MEDICAL EXAMINATION W/OUT ABNORMAL FINDINGS: ICD-10-CM

## 2023-12-19 DIAGNOSIS — Z86.39 PERSONAL HISTORY OF OTHER ENDOCRINE, NUTRITIONAL AND METABOLIC DISEASE: ICD-10-CM

## 2023-12-19 DIAGNOSIS — L50.9 URTICARIA, UNSPECIFIED: ICD-10-CM

## 2023-12-19 PROCEDURE — 99396 PREV VISIT EST AGE 40-64: CPT | Mod: 25

## 2023-12-19 RX ORDER — POLYETHYLENE GLYCOL 3350 AND ELECTROLYTES WITH LEMON FLAVOR 236; 22.74; 6.74; 5.86; 2.97 G/4L; G/4L; G/4L; G/4L; G/4L
236 POWDER, FOR SOLUTION ORAL
Qty: 1 | Refills: 0 | Status: COMPLETED | COMMUNITY
Start: 2022-12-21 | End: 2023-12-19

## 2023-12-20 ENCOUNTER — APPOINTMENT (OUTPATIENT)
Dept: CARDIOLOGY | Facility: CLINIC | Age: 53
End: 2023-12-20
Payer: COMMERCIAL

## 2023-12-20 ENCOUNTER — APPOINTMENT (OUTPATIENT)
Dept: CARDIOLOGY | Facility: CLINIC | Age: 53
End: 2023-12-20

## 2023-12-20 ENCOUNTER — NON-APPOINTMENT (OUTPATIENT)
Age: 53
End: 2023-12-20

## 2023-12-20 VITALS
HEART RATE: 74 BPM | DIASTOLIC BLOOD PRESSURE: 93 MMHG | SYSTOLIC BLOOD PRESSURE: 154 MMHG | OXYGEN SATURATION: 97 % | HEIGHT: 72 IN | BODY MASS INDEX: 29.8 KG/M2 | WEIGHT: 220 LBS

## 2023-12-20 PROCEDURE — 99215 OFFICE O/P EST HI 40 MIN: CPT | Mod: 25

## 2023-12-20 PROCEDURE — 93000 ELECTROCARDIOGRAM COMPLETE: CPT

## 2023-12-21 ENCOUNTER — TRANSCRIPTION ENCOUNTER (OUTPATIENT)
Age: 53
End: 2023-12-21

## 2023-12-21 PROBLEM — L50.9 URTICARIA: Status: ACTIVE | Noted: 2023-11-13

## 2023-12-21 PROBLEM — Z86.39 HISTORY OF HYPERKALEMIA: Status: RESOLVED | Noted: 2022-12-16 | Resolved: 2023-12-21

## 2023-12-21 NOTE — HEALTH RISK ASSESSMENT
Allergic to many tree nuts. Highest reaction to adalberto nut    Also allergic to peanut  Low lvel reaction to peas. Maybe a cross reaction with peanuts    Contiunes to have a high level allergy to egg white [Good] : ~his/her~  mood as  good [Yes] : Yes [Monthly or less (1 pt)] : Monthly or less (1 point) [1 or 2 (0 pts)] : 1 or 2 (0 points) [Never (0 pts)] : Never (0 points) [No] : In the past 12 months have you used drugs other than those required for medical reasons? No [No falls in past year] : Patient reported no falls in the past year [0] : 2) Feeling down, depressed, or hopeless: Not at all (0) [PHQ-2 Negative - No further assessment needed] : PHQ-2 Negative - No further assessment needed [None] : None [With Family] : lives with family [# of Members in Household ___] :  household currently consist of [unfilled] member(s) [Employed] : employed [Graduate School] : graduate school [] :  [# Of Children ___] : has [unfilled] children [Feels Safe at Home] : Feels safe at home [Fully functional (bathing, dressing, toileting, transferring, walking, feeding)] : Fully functional (bathing, dressing, toileting, transferring, walking, feeding) [Fully functional (using the telephone, shopping, preparing meals, housekeeping, doing laundry, using] : Fully functional and needs no help or supervision to perform IADLs (using the telephone, shopping, preparing meals, housekeeping, doing laundry, using transportation, managing medications and managing finances) [Smoke Detector] : smoke detector [Seat Belt] :  uses seat belt [Former] : Former [0-4] : 0-4 [> 15 Years] : > 15 Years [Little interest or pleasure doing things] : 1) Little interest or pleasure doing things [Feeling down, depressed, or hopeless] : 2) Feeling down, depressed, or hopeless [With Patient/Caregiver] : , with patient/caregiver [Audit-CScore] : 1 [de-identified] : 2-3x per week in the gym, mostly cardio for 90 - 120 minutes  [[id number]] : 0 [EyeExamDate] : 03/22 [Change in mental status noted] : No change in mental status noted [Reports changes in hearing] : Reports no changes in hearing [Reports changes in vision] : Reports no changes in vision [Reports changes in dental health] : Reports no changes in dental health [ColonoscopyDate] : 02/23 [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

## 2023-12-21 NOTE — PHYSICAL EXAM
[Well Developed] : well developed [Well Nourished] : well nourished [No Acute Distress] : no acute distress [Normal Conjunctiva] : normal conjunctiva [Normal Venous Pressure] : normal venous pressure [No Carotid Bruit] : no carotid bruit [Normal S1, S2] : normal S1, S2 [No Murmur] : no murmur [Clear Lung Fields] : clear lung fields [No Respiratory Distress] : no respiratory distress  [Soft] : abdomen soft [Non Tender] : non-tender [Normal Gait] : normal gait [No Edema] : no edema [No Cyanosis] : no cyanosis [No Clubbing] : no clubbing [No Varicosities] : no varicosities [No Rash] : no rash [No Skin Lesions] : no skin lesions [Moves all extremities] : moves all extremities [No Focal Deficits] : no focal deficits [Normal Speech] : normal speech [Alert and Oriented] : alert and oriented [Normal memory] : normal memory

## 2023-12-21 NOTE — HISTORY OF PRESENT ILLNESS
[FreeTextEntry1] : Pt presented for PE.  Last PE was 1 year ago. [de-identified] : Pt was hospitalized in 11/2023 for ELLIS when he exercises.  He went to ED and found to have STEMI, He had PCI with stent in LAD.  He is now on ASA, Brillinta, statin and BB.  He is doing well w/o any new complaint.  He saw cardiologist and is maintained on meds.  Pt had COVID infection in early 7/2022, it was mild and recovered completely.  He had 5 doses of COVID vaccine and the Flu vaccine.  Pt started having hives since 7/2023, and takes Allegra daily, but now takes it every 2 days.  Pt noted that he has some ELLIS when he goes up the stairs.  He also noted some pain on R wrist when he makes a fist.  He had it before but better.   He had it again over the last 3 days.  It's actually improve,

## 2023-12-21 NOTE — REVIEW OF SYSTEMS
[Joint Pain] : joint pain [Negative] : Heme/Lymph [Dyspnea on Exertion] : dyspnea on exertion [Fever] : no fever [Chills] : no chills [Fatigue] : no fatigue [Recent Change In Weight] : ~T no recent weight change [Chest Pain] : no chest pain [Palpitations] : no palpitations [Claudication] : no  leg claudication [Lower Ext Edema] : no lower extremity edema [Shortness Of Breath] : no shortness of breath [Wheezing] : no wheezing [Cough] : no cough [Abdominal Pain] : no abdominal pain [Nausea] : no nausea [Constipation] : no constipation [Diarrhea] : no diarrhea [Vomiting] : no vomiting [Heartburn] : no heartburn [Melena] : no melena [Dysuria] : no dysuria [Incontinence] : no incontinence [Nocturia] : no nocturia [Joint Stiffness] : no joint stiffness [Muscle Pain] : no muscle pain [Back Pain] : no back pain [Joint Swelling] : no joint swelling [Itching] : no itching [Mole Changes] : no mole changes [Skin Rash] : no skin rash [Headache] : no headache [Dizziness] : no dizziness [Fainting] : no fainting [Unsteady Walk] : no ataxia [Insomnia] : no insomnia [Anxiety] : no anxiety [Depression] : no depression [Easy Bleeding] : no easy bleeding [Easy Bruising] : no easy bruising [Swollen Glands] : no swollen glands [FreeTextEntry6] : ELLIS as in HPI, [de-identified] : Urticaria as in HPI, [FreeTextEntry9] : R wrist pain as in HPI,  [de-identified] : Snores a lot when he sleeps, he declined sleep eval at present, but does not feel tired,

## 2023-12-21 NOTE — PHYSICAL EXAM
[No Acute Distress] : no acute distress [Well Nourished] : well nourished [Well Developed] : well developed [Normal Sclera/Conjunctiva] : normal sclera/conjunctiva [PERRL] : pupils equal round and reactive to light [EOMI] : extraocular movements intact [Normal Outer Ear/Nose] : the outer ears and nose were normal in appearance [Normal Oropharynx] : the oropharynx was normal [Normal TMs] : both tympanic membranes were normal [Normal Nasal Mucosa] : the nasal mucosa was normal [No JVD] : no jugular venous distention [No Lymphadenopathy] : no lymphadenopathy [Supple] : supple [No Respiratory Distress] : no respiratory distress  [Clear to Auscultation] : lungs were clear to auscultation bilaterally [Normal Rate] : normal rate  [Regular Rhythm] : with a regular rhythm [Normal S1, S2] : normal S1 and S2 [No Carotid Bruits] : no carotid bruits [Pedal Pulses Present] : the pedal pulses are present [No Edema] : there was no peripheral edema [No Extremity Clubbing/Cyanosis] : no extremity clubbing/cyanosis [Normal Appearance] : normal in appearance [No Nipple Discharge] : no nipple discharge [No Axillary Lymphadenopathy] : no axillary lymphadenopathy [Soft] : abdomen soft [Non Tender] : non-tender [Non-distended] : non-distended [No Masses] : no abdominal mass palpated [No HSM] : no HSM [Normal Bowel Sounds] : normal bowel sounds [Normal Sphincter Tone] : normal sphincter tone [No Mass] : no mass [Prostate Enlargement] : the prostate was not enlarged [Prostate Tenderness] : the prostate was not tender [No Prostate Nodules] : no prostate nodules [No CVA Tenderness] : no CVA  tenderness [No Spinal Tenderness] : no spinal tenderness [No Joint Swelling] : no joint swelling [Grossly Normal Strength/Tone] : grossly normal strength/tone [No Rash] : no rash [Coordination Grossly Intact] : coordination grossly intact [No Focal Deficits] : no focal deficits [Normal Gait] : normal gait [Speech Grossly Normal] : speech grossly normal [Normal Affect] : the affect was normal [Alert and Oriented x3] : oriented to person, place, and time [Normal Mood] : the mood was normal [Stool Occult Blood] : stool negative for occult blood [de-identified] : Pt has 2 brown spot, slightly raised lesions on L axillae, the borderline were well defined, and the surface was relatively smooth, [Declined Rectal Exam] : declined rectal exam [Normal Supraclavicular Nodes] : no supraclavicular lymphadenopathy [Normal Posterior Cervical Nodes] : no posterior cervical lymphadenopathy [Normal Axillary Nodes] : no axillary lymphadenopathy [Normal Anterior Cervical Nodes] : no anterior cervical lymphadenopathy [de-identified] : Overweight male in stated age,  [FreeTextEntry1] : deferred, pt had colonoscopy earlier this year,

## 2023-12-21 NOTE — ASSESSMENT
[FreeTextEntry1] : Pt was UTD with colonoscopy.  He was reminded to have routine eye exam and dental care.

## 2023-12-21 NOTE — ASSESSMENT
[FreeTextEntry1] : #CAD: s/p PCI to mid LAD.   -aggressive RF modification, on statin, BB, DAPT -BP control, will restart Amlodipine and reassess; if need ARB will need repeat Cre to make sure stable and 2 weeks post -will let us know about BP in the next few weeks if it comes down at home; if it is still elevated will restart ARB with careful monitoring of labs -A1c 7, continue metformin, diabetic diet and exercise -encouraged cardio and to start back into some light weights  f/u 3 months

## 2023-12-21 NOTE — REASON FOR VISIT
[Coronary Artery Disease] : coronary artery disease [FreeTextEntry1] : 54 yo M with PMHx of HTN, HL, DMII who presented with an anterior STEMI after exercising at the gym, s/p PCI to mid LAD. Here for follow-up cardiology care.   Hospital course: Anterior STEMI, PCI to mid LAD. LVEF preserved. Now on ASA, Brilinta, Metoprolol and high intensity statin. Repeat cholesterol markedly improved compared to 2022. On Metformin, A1c is 7. Back to doing cardio at the gym with no difficulty. Sometimes SOB with stairs that is better if he mouth breathes with exertion. When he exercises he mouth breaths. No difficulty when on the treadmill or ellipitical. No SOB, CP, palpitations, orthopnea or PND, stable 2 pillows he sleeps on. Overall feels well, just wants to prevent this from happening again. His BP is high; they stopped his Amlodipine and Losartan in the hospital. He notices that he has a hard time stopping a bleed from a small nick or bruises much easier.

## 2023-12-22 ENCOUNTER — TRANSCRIPTION ENCOUNTER (OUTPATIENT)
Age: 53
End: 2023-12-22

## 2024-01-29 ENCOUNTER — TRANSCRIPTION ENCOUNTER (OUTPATIENT)
Age: 54
End: 2024-01-29

## 2024-02-22 ENCOUNTER — TRANSCRIPTION ENCOUNTER (OUTPATIENT)
Age: 54
End: 2024-02-22

## 2024-02-23 RX ORDER — TICAGRELOR 90 MG/1
90 TABLET ORAL TWICE DAILY
Qty: 180 | Refills: 3 | Status: ACTIVE | COMMUNITY
Start: 2023-11-13 | End: 1900-01-01

## 2024-02-23 RX ORDER — ATORVASTATIN CALCIUM 80 MG/1
80 TABLET, FILM COATED ORAL
Qty: 90 | Refills: 3 | Status: ACTIVE | COMMUNITY
Start: 2023-11-13 | End: 1900-01-01

## 2024-03-02 ENCOUNTER — RX RENEWAL (OUTPATIENT)
Age: 54
End: 2024-03-02

## 2024-03-11 ENCOUNTER — RX CHANGE (OUTPATIENT)
Age: 54
End: 2024-03-11

## 2024-03-11 RX ORDER — METFORMIN HYDROCHLORIDE 500 MG/1
500 TABLET, COATED ORAL
Qty: 90 | Refills: 3 | Status: DISCONTINUED | COMMUNITY
Start: 2022-07-05 | End: 2024-03-11

## 2024-03-15 ENCOUNTER — TRANSCRIPTION ENCOUNTER (OUTPATIENT)
Age: 54
End: 2024-03-15

## 2024-03-26 ENCOUNTER — NON-APPOINTMENT (OUTPATIENT)
Age: 54
End: 2024-03-26

## 2024-03-27 ENCOUNTER — APPOINTMENT (OUTPATIENT)
Dept: CARDIOLOGY | Facility: CLINIC | Age: 54
End: 2024-03-27
Payer: COMMERCIAL

## 2024-03-27 VITALS
SYSTOLIC BLOOD PRESSURE: 134 MMHG | DIASTOLIC BLOOD PRESSURE: 88 MMHG | WEIGHT: 220 LBS | BODY MASS INDEX: 29.8 KG/M2 | HEIGHT: 72 IN | HEART RATE: 68 BPM | OXYGEN SATURATION: 96 %

## 2024-03-27 DIAGNOSIS — M79.604 PAIN IN RIGHT LEG: ICD-10-CM

## 2024-03-27 DIAGNOSIS — M79.605 PAIN IN RIGHT LEG: ICD-10-CM

## 2024-03-27 PROCEDURE — 99215 OFFICE O/P EST HI 40 MIN: CPT | Mod: 25

## 2024-03-27 PROCEDURE — 93000 ELECTROCARDIOGRAM COMPLETE: CPT

## 2024-03-29 ENCOUNTER — NON-APPOINTMENT (OUTPATIENT)
Age: 54
End: 2024-03-29

## 2024-03-29 PROBLEM — M79.604 PAIN IN BOTH LOWER EXTREMITIES: Status: ACTIVE | Noted: 2024-03-27

## 2024-03-29 NOTE — ASSESSMENT
[FreeTextEntry1] : #CAD: s/p PCI to mid LAD.  -aggressive RF modification, on statin, BB, DAPT -BP control, on AMlodipine  -home BP monitoring, will call if still elevated and will plan to restart ARB -A1c 7, continue metformin, diabetic diet and exercise -encouraged cardio -leg pain, given CAD, will screen for PAD (MILDRED with exercise)  f/u 6 months; sooner if BP is not controlled at home with Amlodipine

## 2024-03-29 NOTE — PHYSICAL EXAM
[Normal Conjunctiva] : normal conjunctiva [Normal S1, S2] : normal S1, S2 [No Carotid Bruit] : no carotid bruit [No Rub] : no rub [No Murmur] : no murmur [Non Tender] : non-tender [Soft] : abdomen soft [Normal Gait] : normal gait [No Cyanosis] : no cyanosis [No Edema] : no edema [Normal DP B/L] : normal DP B/L [No Skin Lesions] : no skin lesions [Moves all extremities] : moves all extremities [Normal Speech] : normal speech [No Focal Deficits] : no focal deficits [Normal memory] : normal memory [Alert and Oriented] : alert and oriented [de-identified] : CTAB [de-identified] : NAD [de-identified] : no JVD

## 2024-03-29 NOTE — REASON FOR VISIT
[Hyperlipidemia] : hyperlipidemia [Hypertension] : hypertension [Coronary Artery Disease] : coronary artery disease [FreeTextEntry1] : 55 yo M with PMHx of HTN, HL, DMII who presented with an anterior STEMI after exercising at the gym, s/p PCI to mid LAD. Here for follow-up cardiology.   Cholesterol improved. His BP is still not at goal. He would like to try more exercise and watching his salt intake before adding back the Losartan for BP control. He hasn't been able to get back to running on the treadmill at 100%. He is only able to run for a certain time period before he gets leg cramping. Normal DP pulses. No skin lesions. normal capillary refill.  On Metformin; will reassess A1c with next labs. No CP, SOB or palpitations.   Relevant PMHx: Hospital course: Anterior STEMI, PCI to mid LAD. LVEF preserved. Now on ASA, Brilinta, Metoprolol and high intensity statin. Repeat cholesterol markedly improved compared to 2022. On Metformin, A1c is 7. Back to doing cardio at the gym with no difficulty. Sometimes SOB with stairs that is better if he mouth breathes with exertion. When he exercises he mouth breaths. No difficulty when on the treadmill or ellipitical. No SOB, CP, palpitations, orthopnea or PND, stable 2 pillows he sleeps on. Overall feels well, just wants to prevent this from happening again. His BP is high; they stopped his Amlodipine and Losartan in the hospital. He notices that he has a hard time stopping a bleed from a small nick or bruises much easier.

## 2024-03-29 NOTE — REVIEW OF SYSTEMS
[Dyspnea on exertion] : not dyspnea during exertion [SOB] : no shortness of breath [Lower Ext Edema] : no extremity edema [Chest Discomfort] : no chest discomfort [Palpitations] : no palpitations [Leg Claudication] : intermittent leg claudication [Muscle Cramps] : muscle cramps [Myalgia] : myalgia [Negative] : Heme/Lymph

## 2024-05-23 ENCOUNTER — NON-APPOINTMENT (OUTPATIENT)
Age: 54
End: 2024-05-23

## 2024-06-18 ENCOUNTER — TRANSCRIPTION ENCOUNTER (OUTPATIENT)
Age: 54
End: 2024-06-18

## 2024-06-21 LAB
ALBUMIN SERPL ELPH-MCNC: 4.8 G/DL
ALP BLD-CCNC: 108 U/L
ALT SERPL-CCNC: 35 U/L
ANION GAP SERPL CALC-SCNC: 15 MMOL/L
AST SERPL-CCNC: 25 U/L
BASOPHILS # BLD AUTO: 0.07 K/UL
BASOPHILS NFR BLD AUTO: 0.7 %
BILIRUB SERPL-MCNC: 0.6 MG/DL
BUN SERPL-MCNC: 23 MG/DL
CALCIUM SERPL-MCNC: 10 MG/DL
CHLORIDE SERPL-SCNC: 101 MMOL/L
CHOLEST SERPL-MCNC: 122 MG/DL
CK SERPL-CCNC: 154 U/L
CO2 SERPL-SCNC: 24 MMOL/L
CREAT SERPL-MCNC: 1.38 MG/DL
EGFR: 60 ML/MIN/1.73M2
EOSINOPHIL # BLD AUTO: 0.25 K/UL
EOSINOPHIL NFR BLD AUTO: 2.5 %
ESTIMATED AVERAGE GLUCOSE: 180 MG/DL
GLUCOSE SERPL-MCNC: 152 MG/DL
HBA1C MFR BLD HPLC: 7.9 %
HCT VFR BLD CALC: 44.6 %
HDLC SERPL-MCNC: 38 MG/DL
HGB BLD-MCNC: 15.8 G/DL
IMM GRANULOCYTES NFR BLD AUTO: 0.4 %
LDLC SERPL CALC-MCNC: 52 MG/DL
LYMPHOCYTES # BLD AUTO: 2.95 K/UL
LYMPHOCYTES NFR BLD AUTO: 29.3 %
MAN DIFF?: NORMAL
MCHC RBC-ENTMCNC: 31.9 PG
MCHC RBC-ENTMCNC: 35.4 GM/DL
MCV RBC AUTO: 89.9 FL
MONOCYTES # BLD AUTO: 0.94 K/UL
MONOCYTES NFR BLD AUTO: 9.3 %
NEUTROPHILS # BLD AUTO: 5.81 K/UL
NEUTROPHILS NFR BLD AUTO: 57.8 %
NONHDLC SERPL-MCNC: 84 MG/DL
PLATELET # BLD AUTO: 212 K/UL
POTASSIUM SERPL-SCNC: 4.2 MMOL/L
PROT SERPL-MCNC: 7.5 G/DL
RBC # BLD: 4.96 M/UL
RBC # FLD: 12.3 %
SODIUM SERPL-SCNC: 140 MMOL/L
TRIGL SERPL-MCNC: 195 MG/DL
WBC # FLD AUTO: 10.06 K/UL

## 2024-06-28 ENCOUNTER — APPOINTMENT (OUTPATIENT)
Dept: INTERNAL MEDICINE | Facility: CLINIC | Age: 54
End: 2024-06-28
Payer: COMMERCIAL

## 2024-06-28 VITALS
HEART RATE: 58 BPM | BODY MASS INDEX: 30.75 KG/M2 | SYSTOLIC BLOOD PRESSURE: 123 MMHG | OXYGEN SATURATION: 97 % | DIASTOLIC BLOOD PRESSURE: 81 MMHG | TEMPERATURE: 98.2 F | WEIGHT: 227 LBS | HEIGHT: 72 IN

## 2024-06-28 VITALS — DIASTOLIC BLOOD PRESSURE: 76 MMHG | SYSTOLIC BLOOD PRESSURE: 112 MMHG

## 2024-06-28 DIAGNOSIS — E11.9 TYPE 2 DIABETES MELLITUS W/OUT COMPLICATIONS: ICD-10-CM

## 2024-06-28 DIAGNOSIS — I25.5 ISCHEMIC CARDIOMYOPATHY: ICD-10-CM

## 2024-06-28 DIAGNOSIS — I10 ESSENTIAL (PRIMARY) HYPERTENSION: ICD-10-CM

## 2024-06-28 DIAGNOSIS — E66.3 OVERWEIGHT: ICD-10-CM

## 2024-06-28 DIAGNOSIS — I25.10 ATHEROSCLEROTIC HEART DISEASE OF NATIVE CORONARY ARTERY W/OUT ANGINA PECTORIS: ICD-10-CM

## 2024-06-28 DIAGNOSIS — E78.5 HYPERLIPIDEMIA, UNSPECIFIED: ICD-10-CM

## 2024-06-28 PROCEDURE — G2211 COMPLEX E/M VISIT ADD ON: CPT | Mod: NC,1L

## 2024-06-28 PROCEDURE — 99214 OFFICE O/P EST MOD 30 MIN: CPT

## 2024-07-01 ENCOUNTER — RX RENEWAL (OUTPATIENT)
Age: 54
End: 2024-07-01

## 2024-08-06 ENCOUNTER — TRANSCRIPTION ENCOUNTER (OUTPATIENT)
Age: 54
End: 2024-08-06

## 2024-08-07 ENCOUNTER — TRANSCRIPTION ENCOUNTER (OUTPATIENT)
Age: 54
End: 2024-08-07

## 2024-08-21 ENCOUNTER — RX RENEWAL (OUTPATIENT)
Age: 54
End: 2024-08-21

## 2024-09-18 ENCOUNTER — TRANSCRIPTION ENCOUNTER (OUTPATIENT)
Age: 54
End: 2024-09-18

## 2024-09-23 ENCOUNTER — TRANSCRIPTION ENCOUNTER (OUTPATIENT)
Age: 54
End: 2024-09-23

## 2024-09-23 LAB
ALBUMIN SERPL ELPH-MCNC: 4.9 G/DL
ALP BLD-CCNC: 95 U/L
ALT SERPL-CCNC: 38 U/L
ANION GAP SERPL CALC-SCNC: 14 MMOL/L
AST SERPL-CCNC: 27 U/L
BASOPHILS # BLD AUTO: 0.05 K/UL
BASOPHILS NFR BLD AUTO: 0.6 %
BILIRUB SERPL-MCNC: 1 MG/DL
BUN SERPL-MCNC: 19 MG/DL
CALCIUM SERPL-MCNC: 10.4 MG/DL
CHLORIDE SERPL-SCNC: 102 MMOL/L
CHOLEST SERPL-MCNC: 104 MG/DL
CK SERPL-CCNC: 177 U/L
CO2 SERPL-SCNC: 26 MMOL/L
CREAT SERPL-MCNC: 1.32 MG/DL
EGFR: 64 ML/MIN/1.73M2
EOSINOPHIL # BLD AUTO: 0.2 K/UL
EOSINOPHIL NFR BLD AUTO: 2.4 %
ESTIMATED AVERAGE GLUCOSE: 163 MG/DL
GLUCOSE SERPL-MCNC: 134 MG/DL
HBA1C MFR BLD HPLC: 7.3 %
HCT VFR BLD CALC: 46.3 %
HDLC SERPL-MCNC: 41 MG/DL
HGB BLD-MCNC: 16 G/DL
IMM GRANULOCYTES NFR BLD AUTO: 0.4 %
LDLC SERPL CALC-MCNC: 40 MG/DL
LYMPHOCYTES # BLD AUTO: 2.36 K/UL
LYMPHOCYTES NFR BLD AUTO: 28.4 %
MAN DIFF?: NORMAL
MCHC RBC-ENTMCNC: 32.1 PG
MCHC RBC-ENTMCNC: 34.6 GM/DL
MCV RBC AUTO: 92.8 FL
MONOCYTES # BLD AUTO: 0.84 K/UL
MONOCYTES NFR BLD AUTO: 10.1 %
NEUTROPHILS # BLD AUTO: 4.83 K/UL
NEUTROPHILS NFR BLD AUTO: 58.1 %
NONHDLC SERPL-MCNC: 64 MG/DL
PLATELET # BLD AUTO: 231 K/UL
POTASSIUM SERPL-SCNC: 5.2 MMOL/L
PROT SERPL-MCNC: 7.9 G/DL
RBC # BLD: 4.99 M/UL
RBC # FLD: 12.7 %
SODIUM SERPL-SCNC: 141 MMOL/L
TRIGL SERPL-MCNC: 135 MG/DL
WBC # FLD AUTO: 8.31 K/UL

## 2024-09-24 ENCOUNTER — NON-APPOINTMENT (OUTPATIENT)
Age: 54
End: 2024-09-24

## 2024-09-25 ENCOUNTER — APPOINTMENT (OUTPATIENT)
Dept: CARDIOLOGY | Facility: CLINIC | Age: 54
End: 2024-09-25
Payer: COMMERCIAL

## 2024-09-25 ENCOUNTER — NON-APPOINTMENT (OUTPATIENT)
Age: 54
End: 2024-09-25

## 2024-09-25 VITALS
SYSTOLIC BLOOD PRESSURE: 144 MMHG | OXYGEN SATURATION: 98 % | HEART RATE: 65 BPM | DIASTOLIC BLOOD PRESSURE: 84 MMHG | BODY MASS INDEX: 29.3 KG/M2 | WEIGHT: 216 LBS

## 2024-09-25 DIAGNOSIS — I10 ESSENTIAL (PRIMARY) HYPERTENSION: ICD-10-CM

## 2024-09-25 DIAGNOSIS — I25.5 ISCHEMIC CARDIOMYOPATHY: ICD-10-CM

## 2024-09-25 DIAGNOSIS — M79.604 PAIN IN RIGHT LEG: ICD-10-CM

## 2024-09-25 DIAGNOSIS — E11.9 TYPE 2 DIABETES MELLITUS W/OUT COMPLICATIONS: ICD-10-CM

## 2024-09-25 DIAGNOSIS — I25.10 ATHEROSCLEROTIC HEART DISEASE OF NATIVE CORONARY ARTERY W/OUT ANGINA PECTORIS: ICD-10-CM

## 2024-09-25 DIAGNOSIS — E78.5 HYPERLIPIDEMIA, UNSPECIFIED: ICD-10-CM

## 2024-09-25 DIAGNOSIS — M79.605 PAIN IN RIGHT LEG: ICD-10-CM

## 2024-09-25 PROCEDURE — 99215 OFFICE O/P EST HI 40 MIN: CPT | Mod: 25

## 2024-09-25 PROCEDURE — 93000 ELECTROCARDIOGRAM COMPLETE: CPT

## 2024-09-26 VITALS — DIASTOLIC BLOOD PRESSURE: 78 MMHG | SYSTOLIC BLOOD PRESSURE: 128 MMHG

## 2024-09-26 NOTE — ASSESSMENT
[FreeTextEntry1] : #CAD: s/p PCI to mid LAD. -aggressive RF modification, on statin, BB, DAPT -BP control, on AMlodipine -home BP monitoring, will call if still elevated and will plan to restart ARB -A1c 7, continue metformin, diabetic diet and exercise -encouraged cardio -leg pain, given CAD, will screen for PAD (MILDRED with exercise)  f/u 6 months

## 2024-09-26 NOTE — REASON FOR VISIT
[Coronary Artery Disease] : coronary artery disease [FreeTextEntry1] : 55 yo M with PMHx of HTN, HL, DMII who presented with an anterior STEMI after exercising at the gym, s/p PCI to mid LAD. Here for follow-up cardiology.   Back to the gym. Not walking bc of calf cramps. Can power walk though. Weights. No CP or SOB or palpitations. Reviewed recent labs. LDL at goal. PMD went up on Metformin.   Relevant PMHx: Hospital course: Anterior STEMI, PCI to mid LAD. LVEF preserved. Now on ASA, Brilinta, Metoprolol and high intensity statin. Repeat cholesterol markedly improved compared to 2022. On Metformin, A1c is 7. Back to doing cardio at the gym with no difficulty. Sometimes SOB with stairs that is better if he mouth breathes with exertion. When he exercises he mouth breaths. No difficulty when on the treadmill or ellipitical. No SOB, CP, palpitations, orthopnea or PND, stable 2 pillows he sleeps on. Overall feels well, just wants to prevent this from happening again. His BP is high; they stopped his Amlodipine and Losartan in the hospital. He notices that he has a hard time stopping a bleed from a small nick or bruises much easier.

## 2024-09-26 NOTE — PHYSICAL EXAM
[Normal Conjunctiva] : normal conjunctiva [No Carotid Bruit] : no carotid bruit [Normal S1, S2] : normal S1, S2 [No Murmur] : no murmur [No Rub] : no rub [Soft] : abdomen soft [Non Tender] : non-tender [Normal Gait] : normal gait [No Edema] : no edema [No Cyanosis] : no cyanosis [Normal DP B/L] : normal DP B/L [No Skin Lesions] : no skin lesions [Moves all extremities] : moves all extremities [No Focal Deficits] : no focal deficits [Normal Speech] : normal speech [Alert and Oriented] : alert and oriented [Normal memory] : normal memory [de-identified] : NAD [de-identified] : no JVD [de-identified] : CTAB

## 2024-09-26 NOTE — REVIEW OF SYSTEMS
[SOB] : no shortness of breath [Dyspnea on exertion] : not dyspnea during exertion [Chest Discomfort] : no chest discomfort [Lower Ext Edema] : no extremity edema [Leg Claudication] : intermittent leg claudication [Palpitations] : no palpitations [Muscle Cramps] : muscle cramps [Myalgia] : myalgia [Negative] : Heme/Lymph

## 2024-09-26 NOTE — PHYSICAL EXAM
[Normal Conjunctiva] : normal conjunctiva [No Carotid Bruit] : no carotid bruit [Normal S1, S2] : normal S1, S2 [No Murmur] : no murmur [No Rub] : no rub [Soft] : abdomen soft [Non Tender] : non-tender [Normal Gait] : normal gait [No Edema] : no edema [No Cyanosis] : no cyanosis [Normal DP B/L] : normal DP B/L [No Skin Lesions] : no skin lesions [Moves all extremities] : moves all extremities [No Focal Deficits] : no focal deficits [Normal Speech] : normal speech [Alert and Oriented] : alert and oriented [Normal memory] : normal memory [de-identified] : NAD [de-identified] : no JVD [de-identified] : CTAB

## 2024-09-26 NOTE — REASON FOR VISIT
[Coronary Artery Disease] : coronary artery disease [FreeTextEntry1] : 53 yo M with PMHx of HTN, HL, DMII who presented with an anterior STEMI after exercising at the gym, s/p PCI to mid LAD. Here for follow-up cardiology.   Back to the gym. Not walking bc of calf cramps. Can power walk though. Weights. No CP or SOB or palpitations. Reviewed recent labs. LDL at goal. PMD went up on Metformin.   Relevant PMHx: Hospital course: Anterior STEMI, PCI to mid LAD. LVEF preserved. Now on ASA, Brilinta, Metoprolol and high intensity statin. Repeat cholesterol markedly improved compared to 2022. On Metformin, A1c is 7. Back to doing cardio at the gym with no difficulty. Sometimes SOB with stairs that is better if he mouth breathes with exertion. When he exercises he mouth breaths. No difficulty when on the treadmill or ellipitical. No SOB, CP, palpitations, orthopnea or PND, stable 2 pillows he sleeps on. Overall feels well, just wants to prevent this from happening again. His BP is high; they stopped his Amlodipine and Losartan in the hospital. He notices that he has a hard time stopping a bleed from a small nick or bruises much easier.

## 2024-10-04 ENCOUNTER — TRANSCRIPTION ENCOUNTER (OUTPATIENT)
Age: 54
End: 2024-10-04

## 2024-11-16 ENCOUNTER — RX RENEWAL (OUTPATIENT)
Age: 54
End: 2024-11-16

## 2024-12-11 ENCOUNTER — RESULT REVIEW (OUTPATIENT)
Age: 54
End: 2024-12-11

## 2024-12-11 ENCOUNTER — OUTPATIENT (OUTPATIENT)
Dept: OUTPATIENT SERVICES | Facility: HOSPITAL | Age: 54
LOS: 1 days | End: 2024-12-11
Payer: COMMERCIAL

## 2024-12-11 ENCOUNTER — APPOINTMENT (OUTPATIENT)
Dept: ULTRASOUND IMAGING | Facility: HOSPITAL | Age: 54
End: 2024-12-11

## 2024-12-11 DIAGNOSIS — M79.604 PAIN IN RIGHT LEG: ICD-10-CM

## 2024-12-11 PROCEDURE — 93924 LWR XTR VASC STDY BILAT: CPT | Mod: 26

## 2024-12-11 PROCEDURE — 93924 LWR XTR VASC STDY BILAT: CPT

## 2024-12-30 ENCOUNTER — RX RENEWAL (OUTPATIENT)
Age: 54
End: 2024-12-30

## 2025-01-13 ENCOUNTER — APPOINTMENT (OUTPATIENT)
Dept: INTERNAL MEDICINE | Facility: CLINIC | Age: 55
End: 2025-01-13

## 2025-01-20 ENCOUNTER — LABORATORY RESULT (OUTPATIENT)
Age: 55
End: 2025-01-20

## 2025-01-24 ENCOUNTER — APPOINTMENT (OUTPATIENT)
Dept: INTERNAL MEDICINE | Facility: CLINIC | Age: 55
End: 2025-01-24
Payer: COMMERCIAL

## 2025-01-24 VITALS
HEART RATE: 79 BPM | HEIGHT: 72 IN | OXYGEN SATURATION: 97 % | SYSTOLIC BLOOD PRESSURE: 130 MMHG | DIASTOLIC BLOOD PRESSURE: 79 MMHG | BODY MASS INDEX: 30.75 KG/M2 | WEIGHT: 227 LBS

## 2025-01-24 DIAGNOSIS — L98.9 DISORDER OF THE SKIN AND SUBCUTANEOUS TISSUE, UNSPECIFIED: ICD-10-CM

## 2025-01-24 DIAGNOSIS — E78.5 HYPERLIPIDEMIA, UNSPECIFIED: ICD-10-CM

## 2025-01-24 DIAGNOSIS — I10 ESSENTIAL (PRIMARY) HYPERTENSION: ICD-10-CM

## 2025-01-24 DIAGNOSIS — Z00.00 ENCOUNTER FOR GENERAL ADULT MEDICAL EXAMINATION W/OUT ABNORMAL FINDINGS: ICD-10-CM

## 2025-01-24 DIAGNOSIS — Z12.11 ENCOUNTER FOR SCREENING FOR MALIGNANT NEOPLASM OF COLON: ICD-10-CM

## 2025-01-24 DIAGNOSIS — I25.5 ISCHEMIC CARDIOMYOPATHY: ICD-10-CM

## 2025-01-24 DIAGNOSIS — Z87.891 PERSONAL HISTORY OF NICOTINE DEPENDENCE: ICD-10-CM

## 2025-01-24 DIAGNOSIS — L50.9 URTICARIA, UNSPECIFIED: ICD-10-CM

## 2025-01-24 DIAGNOSIS — Z01.818 ENCOUNTER FOR OTHER PREPROCEDURAL EXAMINATION: ICD-10-CM

## 2025-01-24 DIAGNOSIS — E66.3 OVERWEIGHT: ICD-10-CM

## 2025-01-24 DIAGNOSIS — I25.10 ATHEROSCLEROTIC HEART DISEASE OF NATIVE CORONARY ARTERY W/OUT ANGINA PECTORIS: ICD-10-CM

## 2025-01-24 DIAGNOSIS — J30.9 ALLERGIC RHINITIS, UNSPECIFIED: ICD-10-CM

## 2025-01-24 DIAGNOSIS — E11.9 TYPE 2 DIABETES MELLITUS W/OUT COMPLICATIONS: ICD-10-CM

## 2025-01-24 PROCEDURE — 99396 PREV VISIT EST AGE 40-64: CPT

## 2025-01-24 PROCEDURE — 82270 OCCULT BLOOD FECES: CPT

## 2025-01-24 PROCEDURE — 99214 OFFICE O/P EST MOD 30 MIN: CPT | Mod: 25

## 2025-01-25 VITALS — SYSTOLIC BLOOD PRESSURE: 120 MMHG | DIASTOLIC BLOOD PRESSURE: 58 MMHG

## 2025-01-25 PROBLEM — Z01.818 PREOPERATIVE TESTING: Status: RESOLVED | Noted: 2023-02-13 | Resolved: 2025-01-25

## 2025-01-25 PROBLEM — Z12.11 SCREENING FOR COLON CANCER: Status: RESOLVED | Noted: 2022-12-16 | Resolved: 2025-01-25

## 2025-01-25 PROBLEM — L98.9 SKIN LESION: Status: RESOLVED | Noted: 2022-12-16 | Resolved: 2025-01-25

## 2025-02-03 ENCOUNTER — TRANSCRIPTION ENCOUNTER (OUTPATIENT)
Age: 55
End: 2025-02-03

## 2025-03-24 ENCOUNTER — RX RENEWAL (OUTPATIENT)
Age: 55
End: 2025-03-24

## 2025-04-10 ENCOUNTER — RX RENEWAL (OUTPATIENT)
Age: 55
End: 2025-04-10

## 2025-06-16 ENCOUNTER — TRANSCRIPTION ENCOUNTER (OUTPATIENT)
Age: 55
End: 2025-06-16

## 2025-06-24 LAB
ALBUMIN SERPL ELPH-MCNC: 4.7 G/DL
ALP BLD-CCNC: 100 U/L
ALT SERPL-CCNC: 51 U/L
ANION GAP SERPL CALC-SCNC: 14 MMOL/L
AST SERPL-CCNC: 33 U/L
BILIRUB SERPL-MCNC: 0.9 MG/DL
BUN SERPL-MCNC: 15 MG/DL
CALCIUM SERPL-MCNC: 9.4 MG/DL
CHLORIDE SERPL-SCNC: 103 MMOL/L
CHOLEST SERPL-MCNC: 114 MG/DL
CK SERPL-CCNC: 203 U/L
CO2 SERPL-SCNC: 23 MMOL/L
CREAT SERPL-MCNC: 1.38 MG/DL
EGFRCR SERPLBLD CKD-EPI 2021: 60 ML/MIN/1.73M2
ESTIMATED AVERAGE GLUCOSE: 189 MG/DL
GLUCOSE SERPL-MCNC: 149 MG/DL
HBA1C MFR BLD HPLC: 8.2 %
HDLC SERPL-MCNC: 39 MG/DL
LDLC SERPL-MCNC: 51 MG/DL
NONHDLC SERPL-MCNC: 75 MG/DL
POTASSIUM SERPL-SCNC: 4.8 MMOL/L
PROT SERPL-MCNC: 7.1 G/DL
SODIUM SERPL-SCNC: 140 MMOL/L
TRIGL SERPL-MCNC: 137 MG/DL

## 2025-06-25 ENCOUNTER — NON-APPOINTMENT (OUTPATIENT)
Age: 55
End: 2025-06-25

## 2025-06-25 ENCOUNTER — APPOINTMENT (OUTPATIENT)
Dept: INTERNAL MEDICINE | Facility: CLINIC | Age: 55
End: 2025-06-25
Payer: COMMERCIAL

## 2025-06-25 VITALS
HEIGHT: 72 IN | DIASTOLIC BLOOD PRESSURE: 83 MMHG | RESPIRATION RATE: 15 BRPM | HEART RATE: 65 BPM | TEMPERATURE: 98 F | WEIGHT: 226 LBS | OXYGEN SATURATION: 96 % | BODY MASS INDEX: 30.61 KG/M2 | SYSTOLIC BLOOD PRESSURE: 128 MMHG

## 2025-06-25 PROCEDURE — 99214 OFFICE O/P EST MOD 30 MIN: CPT

## 2025-06-25 PROCEDURE — G2211 COMPLEX E/M VISIT ADD ON: CPT | Mod: NC

## 2025-06-25 RX ORDER — EMPAGLIFLOZIN 10 MG/1
10 TABLET, FILM COATED ORAL
Qty: 90 | Refills: 1 | Status: ACTIVE | COMMUNITY
Start: 2025-06-25 | End: 1900-01-01

## 2025-06-26 ENCOUNTER — TRANSCRIPTION ENCOUNTER (OUTPATIENT)
Age: 55
End: 2025-06-26

## 2025-06-26 VITALS — SYSTOLIC BLOOD PRESSURE: 118 MMHG | DIASTOLIC BLOOD PRESSURE: 76 MMHG

## 2025-06-30 ENCOUNTER — TRANSCRIPTION ENCOUNTER (OUTPATIENT)
Age: 55
End: 2025-06-30

## 2025-07-02 ENCOUNTER — APPOINTMENT (OUTPATIENT)
Dept: CARDIOLOGY | Facility: CLINIC | Age: 55
End: 2025-07-02

## 2025-07-02 ENCOUNTER — RX RENEWAL (OUTPATIENT)
Age: 55
End: 2025-07-02

## 2025-07-23 ENCOUNTER — RX CHANGE (OUTPATIENT)
Age: 55
End: 2025-07-23

## 2025-07-23 RX ORDER — METFORMIN HYDROCHLORIDE 1000 MG/1
1000 TABLET, COATED ORAL
Qty: 180 | Refills: 3 | Status: ACTIVE | COMMUNITY
Start: 1900-01-01 | End: 1900-01-01

## 2025-08-01 ENCOUNTER — TRANSCRIPTION ENCOUNTER (OUTPATIENT)
Age: 55
End: 2025-08-01

## 2025-08-04 ENCOUNTER — TRANSCRIPTION ENCOUNTER (OUTPATIENT)
Age: 55
End: 2025-08-04

## 2025-09-08 ENCOUNTER — TRANSCRIPTION ENCOUNTER (OUTPATIENT)
Age: 55
End: 2025-09-08

## 2025-09-10 ENCOUNTER — RX RENEWAL (OUTPATIENT)
Age: 55
End: 2025-09-10

## 2025-09-17 ENCOUNTER — APPOINTMENT (OUTPATIENT)
Dept: CARDIOLOGY | Facility: CLINIC | Age: 55
End: 2025-09-17
Payer: COMMERCIAL

## 2025-09-17 ENCOUNTER — NON-APPOINTMENT (OUTPATIENT)
Age: 55
End: 2025-09-17

## 2025-09-17 ENCOUNTER — APPOINTMENT (OUTPATIENT)
Dept: INTERNAL MEDICINE | Facility: CLINIC | Age: 55
End: 2025-09-17
Payer: COMMERCIAL

## 2025-09-17 VITALS
DIASTOLIC BLOOD PRESSURE: 84 MMHG | HEART RATE: 65 BPM | OXYGEN SATURATION: 93 % | SYSTOLIC BLOOD PRESSURE: 144 MMHG | HEIGHT: 72 IN | WEIGHT: 226 LBS | BODY MASS INDEX: 30.61 KG/M2

## 2025-09-17 VITALS — SYSTOLIC BLOOD PRESSURE: 120 MMHG | DIASTOLIC BLOOD PRESSURE: 82 MMHG

## 2025-09-17 VITALS
HEART RATE: 63 BPM | HEIGHT: 72 IN | BODY MASS INDEX: 30.61 KG/M2 | OXYGEN SATURATION: 96 % | TEMPERATURE: 98 F | WEIGHT: 226 LBS | DIASTOLIC BLOOD PRESSURE: 77 MMHG | SYSTOLIC BLOOD PRESSURE: 122 MMHG

## 2025-09-17 DIAGNOSIS — N52.9 MALE ERECTILE DYSFUNCTION, UNSPECIFIED: ICD-10-CM

## 2025-09-17 DIAGNOSIS — I10 ESSENTIAL (PRIMARY) HYPERTENSION: ICD-10-CM

## 2025-09-17 DIAGNOSIS — E11.9 TYPE 2 DIABETES MELLITUS W/OUT COMPLICATIONS: ICD-10-CM

## 2025-09-17 DIAGNOSIS — M79.604 PAIN IN RIGHT LEG: ICD-10-CM

## 2025-09-17 DIAGNOSIS — I25.5 ISCHEMIC CARDIOMYOPATHY: ICD-10-CM

## 2025-09-17 DIAGNOSIS — M79.605 PAIN IN RIGHT LEG: ICD-10-CM

## 2025-09-17 DIAGNOSIS — E66.3 OVERWEIGHT: ICD-10-CM

## 2025-09-17 DIAGNOSIS — I25.10 ATHEROSCLEROTIC HEART DISEASE OF NATIVE CORONARY ARTERY W/OUT ANGINA PECTORIS: ICD-10-CM

## 2025-09-17 DIAGNOSIS — E78.5 HYPERLIPIDEMIA, UNSPECIFIED: ICD-10-CM

## 2025-09-17 PROCEDURE — G2211 COMPLEX E/M VISIT ADD ON: CPT | Mod: NC

## 2025-09-17 PROCEDURE — 99215 OFFICE O/P EST HI 40 MIN: CPT

## 2025-09-17 PROCEDURE — 93000 ELECTROCARDIOGRAM COMPLETE: CPT

## 2025-09-17 PROCEDURE — 99214 OFFICE O/P EST MOD 30 MIN: CPT

## 2025-09-17 RX ORDER — SILDENAFIL 50 MG/1
50 TABLET ORAL
Qty: 18 | Refills: 1 | Status: ACTIVE | COMMUNITY
Start: 2025-09-17 | End: 1900-01-01

## 2025-09-18 PROBLEM — N52.9 ERECTILE DYSFUNCTION: Status: ACTIVE | Noted: 2025-09-17
